# Patient Record
Sex: MALE | Race: WHITE | Employment: OTHER | URBAN - METROPOLITAN AREA
[De-identification: names, ages, dates, MRNs, and addresses within clinical notes are randomized per-mention and may not be internally consistent; named-entity substitution may affect disease eponyms.]

---

## 2017-02-09 DIAGNOSIS — J44.9 COPD, SEVERE (HCC): Primary | ICD-10-CM

## 2017-02-09 RX ORDER — BUDESONIDE AND FORMOTEROL FUMARATE DIHYDRATE 160; 4.5 UG/1; UG/1
AEROSOL RESPIRATORY (INHALATION)
Qty: 30.6 INHALER | Refills: 1 | Status: SHIPPED | OUTPATIENT
Start: 2017-02-09 | End: 2017-08-08

## 2017-08-08 DIAGNOSIS — J44.9 COPD, SEVERE (HCC): ICD-10-CM

## 2017-08-13 RX ORDER — BUDESONIDE AND FORMOTEROL FUMARATE DIHYDRATE 160; 4.5 UG/1; UG/1
AEROSOL RESPIRATORY (INHALATION)
Qty: 30.6 INHALER | Refills: 0 | Status: SHIPPED | OUTPATIENT
Start: 2017-08-13 | End: 2017-11-12

## 2017-08-13 NOTE — TELEPHONE ENCOUNTER
This patient needs Medicare AWV. Make OV. I did send a prescription refill to his pharmacy for the Symbicort. Riaz Caceres. Martha Fountain MD  Diplomate, American Board of Internal Medicine  [facility]  [address] [place] [address],[address] Floor, [address], [facility], [address] [place] [address]

## 2017-11-12 DIAGNOSIS — J44.9 COPD, SEVERE (HCC): ICD-10-CM

## 2017-11-13 RX ORDER — BUDESONIDE AND FORMOTEROL FUMARATE DIHYDRATE 160; 4.5 UG/1; UG/1
AEROSOL RESPIRATORY (INHALATION)
Qty: 3 INHALER | Refills: 1 | Status: SHIPPED | OUTPATIENT
Start: 2017-11-13 | End: 2018-01-30

## 2017-11-13 NOTE — TELEPHONE ENCOUNTER
Requesting Symbicort   LOV: 12-13-16  RTC: 6 months   Last Relevant Labs: 5-11-6   Filled: 6-22-16 #1 inhaler with 11 refills    No future appointments.      NO ASTHMA its COPD

## 2018-01-30 ENCOUNTER — TELEPHONE (OUTPATIENT)
Dept: INTERNAL MEDICINE CLINIC | Facility: CLINIC | Age: 73
End: 2018-01-30

## 2018-01-30 DIAGNOSIS — J44.9 COPD, SEVERE (HCC): ICD-10-CM

## 2018-01-30 RX ORDER — BUDESONIDE AND FORMOTEROL FUMARATE DIHYDRATE 160; 4.5 UG/1; UG/1
AEROSOL RESPIRATORY (INHALATION)
Qty: 3 INHALER | Refills: 1 | Status: SHIPPED | OUTPATIENT
Start: 2018-01-30 | End: 2018-09-23

## 2018-01-30 NOTE — TELEPHONE ENCOUNTER
Patient's spouse calling to request refill for [facility] 160-4.5 MCG/ACT Inhalation Aerosol.

## 2018-07-13 ENCOUNTER — OFFICE VISIT (OUTPATIENT)
Dept: INTERNAL MEDICINE CLINIC | Facility: CLINIC | Age: 73
End: 2018-07-13

## 2018-07-13 VITALS
BODY MASS INDEX: 28 KG/M2 | SYSTOLIC BLOOD PRESSURE: 130 MMHG | WEIGHT: 200.5 LBS | DIASTOLIC BLOOD PRESSURE: 70 MMHG | TEMPERATURE: 98 F | OXYGEN SATURATION: 94 % | HEART RATE: 84 BPM | RESPIRATION RATE: 24 BRPM

## 2018-07-13 DIAGNOSIS — R91.8 MULTIPLE PULMONARY NODULES DETERMINED BY COMPUTED TOMOGRAPHY OF LUNG: ICD-10-CM

## 2018-07-13 DIAGNOSIS — J44.9 COPD, SEVERE (HCC): Primary | ICD-10-CM

## 2018-07-13 DIAGNOSIS — F17.200 TOBACCO USE DISORDER: ICD-10-CM

## 2018-07-13 PROCEDURE — 99214 OFFICE O/P EST MOD 30 MIN: CPT | Performed by: INTERNAL MEDICINE

## 2018-07-13 RX ORDER — AZITHROMYCIN 250 MG/1
TABLET, FILM COATED ORAL
Qty: 6 TABLET | Refills: 0 | Status: SHIPPED | OUTPATIENT
Start: 2018-07-13 | End: 2018-07-31

## 2018-07-13 RX ORDER — METHYLPREDNISOLONE 4 MG/1
TABLET ORAL
Qty: 1 KIT | Refills: 0 | Status: SHIPPED | OUTPATIENT
Start: 2018-07-13 | End: 2018-07-31
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
## 2018-07-13 NOTE — PATIENT INSTRUCTIONS
- Start steroid pack today. Follow instructions on box  - Start antibiotics today (2 tablets today, then 1 tablet daily until you run out)  - Let us know if you are not feeling better after finishing the steroids and antibiotics.     It was a pleasure seei

## 2018-07-31 NOTE — TELEPHONE ENCOUNTER
Pt requesting 2nd round of antibiotic and steroid. Symptoms have improved but still c/o cough, wheezing and excessive phlegm. No c/o fever or chills. Please send to [facility].

## 2018-07-31 NOTE — TELEPHONE ENCOUNTER
From: Ayan Quinones  Sent: 7/31/2018 4:30 PM CDT  Subject: Medication Renewal Request    Ayan Quinones would like a refill of the following medications:     methylPREDNISolone (MEDROL) 4 MG Oral Tablet Therapy Pack Riddhi Camargo MD]     a

## 2018-08-01 RX ORDER — METHYLPREDNISOLONE 4 MG/1
TABLET ORAL
Qty: 1 KIT | Refills: 0 | Status: SHIPPED
Start: 2018-08-01 | End: 2019-07-24 | Stop reason: ALTCHOICE

## 2018-08-01 RX ORDER — AZITHROMYCIN 250 MG/1
TABLET, FILM COATED ORAL
Qty: 6 TABLET | Refills: 0 | Status: SHIPPED
Start: 2018-08-01 | End: 2019-07-24

## 2018-09-23 DIAGNOSIS — J44.9 COPD, SEVERE (HCC): ICD-10-CM

## 2018-09-24 RX ORDER — BUDESONIDE AND FORMOTEROL FUMARATE DIHYDRATE 160; 4.5 UG/1; UG/1
2 AEROSOL RESPIRATORY (INHALATION) 2 TIMES DAILY
Qty: 3 INHALER | Refills: 1 | Status: SHIPPED | OUTPATIENT
Start: 2018-09-24 | End: 2019-03-29

## 2018-09-24 NOTE — TELEPHONE ENCOUNTER
Refill requested:   Requested Prescriptions     Pending Prescriptions Disp Refills   • Budesonide-Formoterol Fumarate (SYMBICORT) 160-4.5 MCG/ACT Inhalation Aerosol [Pharmacy Med Name: SYMBICORT -4.5] 3 Inhaler 1     Sig: Inhale 2 puffs into the [place] [facility]

## 2019-03-29 DIAGNOSIS — J44.9 COPD, SEVERE (HCC): ICD-10-CM

## 2019-03-29 RX ORDER — BUDESONIDE AND FORMOTEROL FUMARATE DIHYDRATE 160; 4.5 UG/1; UG/1
2 AEROSOL RESPIRATORY (INHALATION) 2 TIMES DAILY
Qty: 3 INHALER | Refills: 0 | Status: SHIPPED | OUTPATIENT
Start: 2019-03-29 | End: 2019-07-09

## 2019-03-29 NOTE — TELEPHONE ENCOUNTER
Symbicort 160-4.5 mg 2 puffs bid filed 9-24-18 3 inhalers with 1 refill     LOV 7-13-18     He does have chronic, severe COPD. States he has been compliant with daily Symbicort.   COPD, severe  Acute exacerbation    return to clinic asap with Dr. Sanjay Kaur

## 2019-07-09 DIAGNOSIS — J44.9 COPD, SEVERE (HCC): ICD-10-CM

## 2019-07-09 NOTE — TELEPHONE ENCOUNTER
Last OV: 7/13/18 with Dr. Afshan Villarreal  Last refill date: 3/29/19     #/refills: #3 Inhaler, 0 refills  When pt was asked to return for OV: asap  Upcoming appt/reason: no upcoming appt  Last labs 6/28/16    Hx of COPD

## 2019-07-10 RX ORDER — BUDESONIDE AND FORMOTEROL FUMARATE DIHYDRATE 160; 4.5 UG/1; UG/1
2 AEROSOL RESPIRATORY (INHALATION) 2 TIMES DAILY
Qty: 3 INHALER | Refills: 0 | Status: SHIPPED | OUTPATIENT
Start: 2019-07-10 | End: 2019-10-15

## 2019-07-24 ENCOUNTER — LAB ENCOUNTER (OUTPATIENT)
Dept: LAB | Age: 74
End: 2019-07-24
Attending: INTERNAL MEDICINE
Payer: MEDICARE

## 2019-07-24 ENCOUNTER — OFFICE VISIT (OUTPATIENT)
Dept: INTERNAL MEDICINE CLINIC | Facility: CLINIC | Age: 74
End: 2019-07-24
Payer: MEDICARE

## 2019-07-24 VITALS
OXYGEN SATURATION: 98 % | HEART RATE: 84 BPM | SYSTOLIC BLOOD PRESSURE: 138 MMHG | WEIGHT: 196.25 LBS | TEMPERATURE: 98 F | DIASTOLIC BLOOD PRESSURE: 68 MMHG | BODY MASS INDEX: 27.48 KG/M2 | HEIGHT: 71 IN

## 2019-07-24 DIAGNOSIS — Z12.5 SCREENING FOR MALIGNANT NEOPLASM OF PROSTATE: ICD-10-CM

## 2019-07-24 DIAGNOSIS — D12.2 ADENOMATOUS POLYP OF ASCENDING COLON: ICD-10-CM

## 2019-07-24 DIAGNOSIS — Z87.891 PERSONAL HISTORY OF NICOTINE DEPENDENCE: ICD-10-CM

## 2019-07-24 DIAGNOSIS — R53.82 CHRONIC FATIGUE: ICD-10-CM

## 2019-07-24 DIAGNOSIS — R91.8 MULTIPLE PULMONARY NODULES DETERMINED BY COMPUTED TOMOGRAPHY OF LUNG: ICD-10-CM

## 2019-07-24 DIAGNOSIS — R73.01 IMPAIRED FASTING GLUCOSE: ICD-10-CM

## 2019-07-24 DIAGNOSIS — E55.9 VITAMIN D INSUFFICIENCY: ICD-10-CM

## 2019-07-24 DIAGNOSIS — J44.9 COPD, SEVERE (HCC): ICD-10-CM

## 2019-07-24 DIAGNOSIS — R09.81 NASAL CONGESTION: ICD-10-CM

## 2019-07-24 DIAGNOSIS — Z00.00 MEDICARE ANNUAL WELLNESS VISIT, SUBSEQUENT: ICD-10-CM

## 2019-07-24 DIAGNOSIS — Z00.00 MEDICARE ANNUAL WELLNESS VISIT, SUBSEQUENT: Primary | ICD-10-CM

## 2019-07-24 LAB
ALBUMIN SERPL-MCNC: 4 G/DL (ref 3.4–5)
ALBUMIN/GLOB SERPL: 1.1 {RATIO} (ref 1–2)
ALP LIVER SERPL-CCNC: 100 U/L (ref 45–117)
ALT SERPL-CCNC: 32 U/L (ref 16–61)
ANION GAP SERPL CALC-SCNC: 6 MMOL/L (ref 0–18)
AST SERPL-CCNC: 19 U/L (ref 15–37)
BASOPHILS # BLD AUTO: 0.06 X10(3) UL (ref 0–0.2)
BASOPHILS NFR BLD AUTO: 0.7 %
BILIRUB SERPL-MCNC: 0.8 MG/DL (ref 0.1–2)
BUN BLD-MCNC: 15 MG/DL (ref 7–18)
BUN/CREAT SERPL: 13.3 (ref 10–20)
CALCIUM BLD-MCNC: 9.5 MG/DL (ref 8.5–10.1)
CHLORIDE SERPL-SCNC: 106 MMOL/L (ref 98–112)
CHOLEST SMN-MCNC: 162 MG/DL (ref ?–200)
CO2 SERPL-SCNC: 25 MMOL/L (ref 21–32)
COMPLEXED PSA SERPL-MCNC: 0.29 NG/ML (ref ?–4)
CREAT BLD-MCNC: 1.13 MG/DL (ref 0.7–1.3)
DEPRECATED RDW RBC AUTO: 46.2 FL (ref 35.1–46.3)
EOSINOPHIL # BLD AUTO: 0.31 X10(3) UL (ref 0–0.7)
EOSINOPHIL NFR BLD AUTO: 3.6 %
ERYTHROCYTE [DISTWIDTH] IN BLOOD BY AUTOMATED COUNT: 12.9 % (ref 11–15)
EST. AVERAGE GLUCOSE BLD GHB EST-MCNC: 120 MG/DL (ref 68–126)
GLOBULIN PLAS-MCNC: 3.5 G/DL (ref 2.8–4.4)
GLUCOSE BLD-MCNC: 103 MG/DL (ref 70–99)
HBA1C MFR BLD HPLC: 5.8 % (ref ?–5.7)
HCT VFR BLD AUTO: 48.6 % (ref 39–53)
HDLC SERPL-MCNC: 35 MG/DL (ref 40–59)
HGB BLD-MCNC: 15.8 G/DL (ref 13–17.5)
IMM GRANULOCYTES # BLD AUTO: 0.02 X10(3) UL (ref 0–1)
IMM GRANULOCYTES NFR BLD: 0.2 %
LDLC SERPL CALC-MCNC: 103 MG/DL (ref ?–100)
LYMPHOCYTES # BLD AUTO: 2.6 X10(3) UL (ref 1–4)
LYMPHOCYTES NFR BLD AUTO: 30.3 %
M PROTEIN MFR SERPL ELPH: 7.5 G/DL (ref 6.4–8.2)
MCH RBC QN AUTO: 31.3 PG (ref 26–34)
MCHC RBC AUTO-ENTMCNC: 32.5 G/DL (ref 31–37)
MCV RBC AUTO: 96.2 FL (ref 80–100)
MONOCYTES # BLD AUTO: 0.66 X10(3) UL (ref 0.1–1)
MONOCYTES NFR BLD AUTO: 7.7 %
NEUTROPHILS # BLD AUTO: 4.94 X10 (3) UL (ref 1.5–7.7)
NEUTROPHILS # BLD AUTO: 4.94 X10(3) UL (ref 1.5–7.7)
NEUTROPHILS NFR BLD AUTO: 57.5 %
NONHDLC SERPL-MCNC: 127 MG/DL (ref ?–130)
OSMOLALITY SERPL CALC.SUM OF ELEC: 285 MOSM/KG (ref 275–295)
PLATELET # BLD AUTO: 207 10(3)UL (ref 150–450)
POTASSIUM SERPL-SCNC: 4 MMOL/L (ref 3.5–5.1)
RBC # BLD AUTO: 5.05 X10(6)UL (ref 3.8–5.8)
SODIUM SERPL-SCNC: 137 MMOL/L (ref 136–145)
TRIGL SERPL-MCNC: 121 MG/DL (ref 30–149)
TSI SER-ACNC: 2.07 MIU/ML (ref 0.36–3.74)
VIT D+METAB SERPL-MCNC: 35.8 NG/ML (ref 30–100)
VLDLC SERPL CALC-MCNC: 24 MG/DL (ref 0–30)
WBC # BLD AUTO: 8.6 X10(3) UL (ref 4–11)

## 2019-07-24 PROCEDURE — G0439 PPPS, SUBSEQ VISIT: HCPCS | Performed by: INTERNAL MEDICINE

## 2019-07-24 PROCEDURE — 84443 ASSAY THYROID STIM HORMONE: CPT

## 2019-07-24 PROCEDURE — 84402 ASSAY OF FREE TESTOSTERONE: CPT

## 2019-07-24 PROCEDURE — 99214 OFFICE O/P EST MOD 30 MIN: CPT | Performed by: INTERNAL MEDICINE

## 2019-07-24 PROCEDURE — 36415 COLL VENOUS BLD VENIPUNCTURE: CPT

## 2019-07-24 PROCEDURE — 80061 LIPID PANEL: CPT

## 2019-07-24 PROCEDURE — 80053 COMPREHEN METABOLIC PANEL: CPT

## 2019-07-24 PROCEDURE — 84403 ASSAY OF TOTAL TESTOSTERONE: CPT

## 2019-07-24 PROCEDURE — 85025 COMPLETE CBC W/AUTO DIFF WBC: CPT

## 2019-07-24 PROCEDURE — 83036 HEMOGLOBIN GLYCOSYLATED A1C: CPT

## 2019-07-24 PROCEDURE — 82306 VITAMIN D 25 HYDROXY: CPT

## 2019-07-24 RX ORDER — ALBUTEROL SULFATE 90 UG/1
2 AEROSOL, METERED RESPIRATORY (INHALATION) EVERY 4 HOURS PRN
Qty: 3 INHALER | Refills: 0 | Status: ON HOLD | OUTPATIENT
Start: 2019-07-24 | End: 2020-07-28

## 2019-07-24 NOTE — PROGRESS NOTES
HPI:   Adrianna Taylor is a 69year old male who presents for a Medicare Subsequent Annual Wellness visit (Pt already had Initial Annual Wellness) and follow up on chronic medical issues.   His last annual assessment has been over 1 year: Annual Physic explanation and discussion of advance directives standard forms performed Face to Face with patient and Family/surrogate (if present), and forms available to patient in AVS         He currently smokes tobacco.  Social History    Tobacco Use      Smoking st Inhalation Aero Soln Inhale 2 puffs into the lungs every 4 (four) hours as needed for Wheezing or Shortness of Breath. Budesonide-Formoterol Fumarate (SYMBICORT) 160-4.5 MCG/ACT Inhalation Aerosol Inhale 2 puffs into the lungs 2 (two) times daily.    Chol history  ALL/ASTHMA: denies hx of allergy or asthma  EXAM:   /68 (BP Location: Right arm, Patient Position: Sitting, Cuff Size: large)   Pulse 84   Temp 97.7 °F (36.5 °C) (Oral)   Ht 71\"   Wt 196 lb 4 oz   SpO2 98%   BMI 27.37 kg/m²   Estimated body focus on diet/exercise. He had colonoscopy in 2013 - hyperplastic and adenomatous polyp. Due for repeat colonoscopy. Last colonoscopy done by Dr. Shavonne Davis - he has retired. Will refer to his partners. - CBC WITH DIFFERENTIAL WITH PLATELET;  Future  - COM PLAN:  The patient indicates understanding of these issues and agrees to the plan. Reinforced healthy diet, lifestyle, and exercise. Return in about 1 year (around 7/24/2020).      Robert Vick MD, 7/24/2019     General Health     In the past six Influenza  Covered Annually 12/5/2017   Please get every year    Pneumococcal 13 (Prevnar)  Covered Once after 65 12/05/2017 Please get once after your 65th birthday    Pneumococcal 23 (Pneumovax)  Covered Once after 65 12/26/2012 Please get once after

## 2019-07-24 NOTE — PATIENT INSTRUCTIONS
- Get blood work done today  - Schedule lung screening CT scan ([phone])  - We will have you see one of Dr. Cole Lowe partners for your next colonoscopy when you are due. - Albuterol refilled. It was a pleasure seeing you in the clinic today.   Than

## 2019-07-25 PROBLEM — R73.03 PREDIABETES: Status: ACTIVE | Noted: 2019-07-25

## 2019-07-28 DIAGNOSIS — Z12.11 SCREENING FOR COLON CANCER: Primary | ICD-10-CM

## 2019-07-28 DIAGNOSIS — Z86.010 HISTORY OF ADENOMATOUS POLYP OF COLON: ICD-10-CM

## 2019-07-28 LAB
TESTOSTERONE TOTAL: 442.6 NG/DL
TESTOSTERONE, FREE -MS/MS: 70.6 PG/ML

## 2019-08-09 ENCOUNTER — HOSPITAL ENCOUNTER (OUTPATIENT)
Dept: CT IMAGING | Facility: HOSPITAL | Age: 74
Discharge: HOME | End: 2019-08-09
Attending: INTERNAL MEDICINE
Payer: MEDICARE

## 2019-08-09 DIAGNOSIS — Z87.891 PERSONAL HISTORY OF NICOTINE DEPENDENCE: ICD-10-CM

## 2019-08-09 DIAGNOSIS — J44.9 COPD, SEVERE (HCC): ICD-10-CM

## 2019-08-09 DIAGNOSIS — R91.8 MULTIPLE PULMONARY NODULES DETERMINED BY COMPUTED TOMOGRAPHY OF LUNG: ICD-10-CM

## 2019-08-14 DIAGNOSIS — R91.8 ABNORMAL CT LUNG SCREENING: Primary | ICD-10-CM

## 2019-08-14 DIAGNOSIS — Z87.891 PERSONAL HISTORY OF NICOTINE DEPENDENCE: ICD-10-CM

## 2019-08-14 DIAGNOSIS — R91.1 PULMONARY NODULE, RIGHT: ICD-10-CM

## 2019-08-23 ENCOUNTER — OFFICE VISIT (OUTPATIENT)
Dept: OCCUPATIONAL MEDICINE | Age: 74
End: 2019-08-23
Attending: PHYSICIAN ASSISTANT
Payer: OTHER MISCELLANEOUS

## 2019-08-23 ENCOUNTER — HOSPITAL ENCOUNTER (EMERGENCY)
Facility: HOSPITAL | Age: 74
Discharge: HOME | End: 2019-08-23
Attending: EMERGENCY MEDICINE
Payer: OTHER MISCELLANEOUS

## 2019-08-23 ENCOUNTER — HOSPITAL ENCOUNTER (OUTPATIENT)
Dept: GENERAL RADIOLOGY | Age: 74
Discharge: HOME | End: 2019-08-23
Attending: PHYSICIAN ASSISTANT
Payer: OTHER MISCELLANEOUS

## 2019-08-23 VITALS
HEART RATE: 91 BPM | RESPIRATION RATE: 23 BRPM | DIASTOLIC BLOOD PRESSURE: 77 MMHG | BODY MASS INDEX: 27 KG/M2 | WEIGHT: 196 LBS | OXYGEN SATURATION: 94 % | SYSTOLIC BLOOD PRESSURE: 173 MMHG | TEMPERATURE: 99 F

## 2019-08-23 DIAGNOSIS — S20.211A RIB CONTUSION, RIGHT, INITIAL ENCOUNTER: Primary | ICD-10-CM

## 2019-08-23 DIAGNOSIS — S20.211A RIB CONTUSION, RIGHT, INITIAL ENCOUNTER: ICD-10-CM

## 2019-08-23 DIAGNOSIS — S22.41XA CLOSED FRACTURE OF MULTIPLE RIBS OF RIGHT SIDE, INITIAL ENCOUNTER: Primary | ICD-10-CM

## 2019-08-23 LAB
ALBUMIN SERPL-MCNC: 3.9 G/DL (ref 3.4–5)
ALBUMIN/GLOB SERPL: 1 {RATIO} (ref 1–2)
ALP LIVER SERPL-CCNC: 104 U/L (ref 45–117)
ALT SERPL-CCNC: 46 U/L (ref 16–61)
ANION GAP SERPL CALC-SCNC: 8 MMOL/L (ref 0–18)
APTT PPP: 38.1 SECONDS (ref 25.4–36.1)
AST SERPL-CCNC: 34 U/L (ref 15–37)
BASOPHILS # BLD AUTO: 0.03 X10(3) UL (ref 0–0.2)
BASOPHILS NFR BLD AUTO: 0.2 %
BILIRUB SERPL-MCNC: 1.2 MG/DL (ref 0.1–2)
BUN BLD-MCNC: 15 MG/DL (ref 7–18)
BUN/CREAT SERPL: 14.9 (ref 10–20)
CALCIUM BLD-MCNC: 8.9 MG/DL (ref 8.5–10.1)
CHLORIDE SERPL-SCNC: 102 MMOL/L (ref 98–112)
CO2 SERPL-SCNC: 25 MMOL/L (ref 21–32)
CREAT BLD-MCNC: 1.01 MG/DL (ref 0.7–1.3)
DEPRECATED RDW RBC AUTO: 42.4 FL (ref 35.1–46.3)
EOSINOPHIL # BLD AUTO: 0.09 X10(3) UL (ref 0–0.7)
EOSINOPHIL NFR BLD AUTO: 0.7 %
ERYTHROCYTE [DISTWIDTH] IN BLOOD BY AUTOMATED COUNT: 12.5 % (ref 11–15)
GLOBULIN PLAS-MCNC: 4.1 G/DL (ref 2.8–4.4)
GLUCOSE BLD-MCNC: 130 MG/DL (ref 70–99)
HCT VFR BLD AUTO: 44.3 % (ref 39–53)
HGB BLD-MCNC: 15 G/DL (ref 13–17.5)
IMM GRANULOCYTES # BLD AUTO: 0.08 X10(3) UL (ref 0–1)
IMM GRANULOCYTES NFR BLD: 0.6 %
INR BLD: 1.03 (ref 0.9–1.1)
LYMPHOCYTES # BLD AUTO: 2.2 X10(3) UL (ref 1–4)
LYMPHOCYTES NFR BLD AUTO: 16.4 %
M PROTEIN MFR SERPL ELPH: 8 G/DL (ref 6.4–8.2)
MCH RBC QN AUTO: 31.3 PG (ref 26–34)
MCHC RBC AUTO-ENTMCNC: 33.9 G/DL (ref 31–37)
MCV RBC AUTO: 92.3 FL (ref 80–100)
MONOCYTES # BLD AUTO: 1.23 X10(3) UL (ref 0.1–1)
MONOCYTES NFR BLD AUTO: 9.2 %
NEUTROPHILS # BLD AUTO: 9.75 X10 (3) UL (ref 1.5–7.7)
NEUTROPHILS # BLD AUTO: 9.75 X10(3) UL (ref 1.5–7.7)
NEUTROPHILS NFR BLD AUTO: 72.9 %
OSMOLALITY SERPL CALC.SUM OF ELEC: 283 MOSM/KG (ref 275–295)
PLATELET # BLD AUTO: 241 10(3)UL (ref 150–450)
POTASSIUM SERPL-SCNC: 4.2 MMOL/L (ref 3.5–5.1)
PSA SERPL DL<=0.01 NG/ML-MCNC: 13.9 SECONDS (ref 12.5–14.7)
RBC # BLD AUTO: 4.8 X10(6)UL (ref 3.8–5.8)
SODIUM SERPL-SCNC: 135 MMOL/L (ref 136–145)
TROPONIN I SERPL-MCNC: <0.045 NG/ML (ref ?–0.04)
WBC # BLD AUTO: 13.4 X10(3) UL (ref 4–11)

## 2019-08-23 PROCEDURE — 93005 ELECTROCARDIOGRAM TRACING: CPT

## 2019-08-23 PROCEDURE — 99284 EMERGENCY DEPT VISIT MOD MDM: CPT

## 2019-08-23 PROCEDURE — 36415 COLL VENOUS BLD VENIPUNCTURE: CPT

## 2019-08-23 PROCEDURE — 93010 ELECTROCARDIOGRAM REPORT: CPT

## 2019-08-23 PROCEDURE — 80053 COMPREHEN METABOLIC PANEL: CPT | Performed by: EMERGENCY MEDICINE

## 2019-08-23 PROCEDURE — 84484 ASSAY OF TROPONIN QUANT: CPT | Performed by: EMERGENCY MEDICINE

## 2019-08-23 PROCEDURE — 85730 THROMBOPLASTIN TIME PARTIAL: CPT | Performed by: EMERGENCY MEDICINE

## 2019-08-23 PROCEDURE — 85025 COMPLETE CBC W/AUTO DIFF WBC: CPT | Performed by: EMERGENCY MEDICINE

## 2019-08-23 PROCEDURE — 85610 PROTHROMBIN TIME: CPT | Performed by: EMERGENCY MEDICINE

## 2019-08-23 PROCEDURE — 71101 X-RAY EXAM UNILAT RIBS/CHEST: CPT | Performed by: PHYSICIAN ASSISTANT

## 2019-08-23 RX ORDER — TRAMADOL HYDROCHLORIDE 50 MG/1
50 TABLET ORAL EVERY 6 HOURS PRN
Qty: 20 TABLET | Refills: 0 | Status: SHIPPED | OUTPATIENT
Start: 2019-08-23 | End: 2019-08-30

## 2019-08-23 RX ORDER — IPRATROPIUM BROMIDE AND ALBUTEROL SULFATE 2.5; .5 MG/3ML; MG/3ML
3 SOLUTION RESPIRATORY (INHALATION)
Status: DISCONTINUED | OUTPATIENT
Start: 2019-08-23 | End: 2019-08-23
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
## 2019-08-25 LAB
ATRIAL RATE: 102 BPM
P AXIS: 42 DEGREES
P-R INTERVAL: 180 MS
Q-T INTERVAL: 342 MS
QRS DURATION: 88 MS
QTC CALCULATION (BEZET): 445 MS
R AXIS: 33 DEGREES
T AXIS: 49 DEGREES
VENTRICULAR RATE: 102 BPM

## 2019-08-28 ENCOUNTER — TELEPHONE (OUTPATIENT)
Dept: INTERNAL MEDICINE CLINIC | Facility: CLINIC | Age: 74
End: 2019-08-28

## 2019-08-28 ENCOUNTER — OFFICE VISIT (OUTPATIENT)
Dept: OCCUPATIONAL MEDICINE | Age: 74
End: 2019-08-28
Attending: PHYSICIAN ASSISTANT
Payer: OTHER MISCELLANEOUS

## 2019-08-28 DIAGNOSIS — S22.31XD FRACTURE OF ONE RIB, RIGHT SIDE, SUBSEQUENT ENCOUNTER FOR FRACTURE WITH ROUTINE HEALING: Primary | ICD-10-CM

## 2019-08-30 ENCOUNTER — OFFICE VISIT (OUTPATIENT)
Dept: INTERNAL MEDICINE CLINIC | Facility: CLINIC | Age: 74
End: 2019-08-30
Payer: MEDICARE

## 2019-08-30 VITALS
RESPIRATION RATE: 20 BRPM | DIASTOLIC BLOOD PRESSURE: 70 MMHG | TEMPERATURE: 98 F | WEIGHT: 199.5 LBS | HEIGHT: 71 IN | SYSTOLIC BLOOD PRESSURE: 146 MMHG | HEART RATE: 72 BPM | BODY MASS INDEX: 27.93 KG/M2

## 2019-08-30 DIAGNOSIS — R73.03 PREDIABETES: ICD-10-CM

## 2019-08-30 DIAGNOSIS — S22.41XA CLOSED FRACTURE OF MULTIPLE RIBS OF RIGHT SIDE, INITIAL ENCOUNTER: Primary | ICD-10-CM

## 2019-08-30 DIAGNOSIS — J44.9 COPD, SEVERE (HCC): ICD-10-CM

## 2019-08-30 DIAGNOSIS — Z12.11 SCREENING FOR COLON CANCER: ICD-10-CM

## 2019-08-30 DIAGNOSIS — R91.8 MULTIPLE PULMONARY NODULES DETERMINED BY COMPUTED TOMOGRAPHY OF LUNG: ICD-10-CM

## 2019-08-30 PROCEDURE — 99214 OFFICE O/P EST MOD 30 MIN: CPT | Performed by: INTERNAL MEDICINE
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
## 2019-08-30 NOTE — PATIENT INSTRUCTIONS
- Rib fractures should heal on their own. - Continue with incentive spirometer  - Continue with ibuprofen as needed for pain, tramadol for severe pain. - Schedule repeat low-dose CT scan in February for monitoring of your lung nodule.   - Follow up with ERLIN

## 2019-09-11 ENCOUNTER — OFFICE VISIT (OUTPATIENT)
Dept: OCCUPATIONAL MEDICINE | Age: 74
End: 2019-09-11
Attending: PHYSICIAN ASSISTANT
Payer: OTHER MISCELLANEOUS

## 2019-09-11 ENCOUNTER — HOSPITAL ENCOUNTER (OUTPATIENT)
Dept: GENERAL RADIOLOGY | Age: 74
Discharge: HOME | End: 2019-09-11
Attending: PHYSICIAN ASSISTANT
Payer: OTHER MISCELLANEOUS

## 2019-09-11 DIAGNOSIS — S22.31XD FRACTURE OF ONE RIB, RIGHT SIDE, SUBSEQUENT ENCOUNTER FOR FRACTURE WITH ROUTINE HEALING: Primary | ICD-10-CM

## 2019-09-11 DIAGNOSIS — S22.31XD FRACTURE OF ONE RIB, RIGHT SIDE, SUBSEQUENT ENCOUNTER FOR FRACTURE WITH ROUTINE HEALING: ICD-10-CM

## 2019-09-11 PROCEDURE — 71101 X-RAY EXAM UNILAT RIBS/CHEST: CPT | Performed by: PHYSICIAN ASSISTANT

## 2019-10-15 DIAGNOSIS — J44.9 COPD, SEVERE (HCC): ICD-10-CM

## 2019-10-16 RX ORDER — BUDESONIDE AND FORMOTEROL FUMARATE DIHYDRATE 160; 4.5 UG/1; UG/1
2 AEROSOL RESPIRATORY (INHALATION) 2 TIMES DAILY
Qty: 3 INHALER | Refills: 0 | Status: SHIPPED | OUTPATIENT
Start: 2019-10-16 | End: 2020-01-14

## 2020-01-14 DIAGNOSIS — J44.9 COPD, SEVERE (HCC): ICD-10-CM

## 2020-01-15 RX ORDER — BUDESONIDE AND FORMOTEROL FUMARATE DIHYDRATE 160; 4.5 UG/1; UG/1
2 AEROSOL RESPIRATORY (INHALATION) 2 TIMES DAILY
Qty: 3 INHALER | Refills: 1 | Status: SHIPPED | OUTPATIENT
Start: 2020-01-15 | End: 2020-08-16

## 2020-01-15 NOTE — TELEPHONE ENCOUNTER
Last OV: 8/30/19 with Dr. Yonas Chauhan   Last refill date: 10/16/19     #/refills: #3 Inhaler, 0 refills  When pt was asked to return for OV: 3-6 months  Upcoming appt/reason: no upcoming appt  Last labs 8/23/19

## 2020-02-26 DIAGNOSIS — R91.8 MULTIPLE PULMONARY NODULES DETERMINED BY COMPUTED TOMOGRAPHY OF LUNG: ICD-10-CM

## 2020-02-26 DIAGNOSIS — Z87.891 PERSONAL HISTORY OF TOBACCO USE: Primary | ICD-10-CM

## 2020-02-26 NOTE — PROGRESS NOTES
Patient is now due for updated Lung Screening CT scan - order placed. Please reach out to patient and have him schedule it.   He can call central scheduling, the radiology department note also has their direct number [phone] (I'm not sure if this is

## 2020-02-27 NOTE — PROGRESS NOTES
Left detailed message on pt's vm, ok per HIPPA. Informing due for a repeat CT of lungs and to call us back if has additional questions. Also [employer] message sent to pt informing about CT.

## 2020-03-02 NOTE — PROGRESS NOTES
Pt contacted. States he received the message regarding the need for lung CT and will call to schedule. Contact information for scheduling provided.

## 2020-03-06 ENCOUNTER — HOSPITAL ENCOUNTER (OUTPATIENT)
Dept: CT IMAGING | Facility: HOSPITAL | Age: 75
Discharge: HOME | End: 2020-03-06
Attending: INTERNAL MEDICINE
Payer: MEDICARE

## 2020-03-06 DIAGNOSIS — Z87.891 PERSONAL HISTORY OF TOBACCO USE: ICD-10-CM

## 2020-03-06 DIAGNOSIS — R91.8 MULTIPLE PULMONARY NODULES DETERMINED BY COMPUTED TOMOGRAPHY OF LUNG: ICD-10-CM

## 2020-03-13 DIAGNOSIS — R91.8 MULTIPLE PULMONARY NODULES DETERMINED BY COMPUTED TOMOGRAPHY OF LUNG: Primary | ICD-10-CM

## 2020-05-19 ENCOUNTER — VIRTUAL PHONE E/M (OUTPATIENT)
Dept: INTERNAL MEDICINE CLINIC | Facility: CLINIC | Age: 75
End: 2020-05-19
Payer: MEDICARE

## 2020-05-19 DIAGNOSIS — R91.8 MULTIPLE PULMONARY NODULES DETERMINED BY COMPUTED TOMOGRAPHY OF LUNG: Primary | ICD-10-CM

## 2020-05-19 DIAGNOSIS — J44.9 COPD, SEVERE (HCC): ICD-10-CM

## 2020-05-19 PROCEDURE — 99442 PHONE E/M BY PHYS 11-20 MIN: CPT | Performed by: INTERNAL MEDICINE

## 2020-05-19 NOTE — PROGRESS NOTES
Georginakiley Lacey verbally consents to a Virtual/Telephone Check-In service on 05/19/20. Patient understands and accepts financial responsibility for any deductible, co-insurance and/or co-pays associated with this service.      Duration of the service: have been monitored with low-dose screening lung CT scans. Most recently, he had a screening CT done in March 2020. A nodule in the right upper lobe has grown in size. It is irregular in appearance. Lung-RADS category 4A.   Patient's CT scan was reviewe

## 2020-05-19 NOTE — PATIENT INSTRUCTIONS
- Ok to schedule lung CT scan  - I will message you codes for CT scan. You can talk to Medicare about cost before scheduling the test  - We will try an additional inhaler for your COPD - Spiriva. Use once daily.   - We will likely have you follow up with

## 2020-05-20 ENCOUNTER — TELEPHONE (OUTPATIENT)
Dept: INTERNAL MEDICINE CLINIC | Facility: CLINIC | Age: 75
End: 2020-05-20

## 2020-05-20 DIAGNOSIS — J44.9 COPD, SEVERE (HCC): Primary | Chronic | ICD-10-CM

## 2020-05-20 NOTE — TELEPHONE ENCOUNTER
Spiriva is not covered by insurance please send alternative medication or we can initiate the PA, please advise.        PA phone# [phone]  Patient ID [id number]

## 2020-06-18 ENCOUNTER — OFFICE VISIT (OUTPATIENT)
Dept: INTERNAL MEDICINE CLINIC | Facility: CLINIC | Age: 75
End: 2020-06-18
Payer: MEDICARE

## 2020-06-18 VITALS
RESPIRATION RATE: 18 BRPM | OXYGEN SATURATION: 98 % | WEIGHT: 192.75 LBS | TEMPERATURE: 98 F | SYSTOLIC BLOOD PRESSURE: 130 MMHG | BODY MASS INDEX: 26.98 KG/M2 | DIASTOLIC BLOOD PRESSURE: 70 MMHG | HEART RATE: 84 BPM | HEIGHT: 71 IN

## 2020-06-18 DIAGNOSIS — R91.8 MULTIPLE PULMONARY NODULES DETERMINED BY COMPUTED TOMOGRAPHY OF LUNG: ICD-10-CM

## 2020-06-18 DIAGNOSIS — R73.03 PREDIABETES: ICD-10-CM

## 2020-06-18 DIAGNOSIS — J44.9 COPD, SEVERE (HCC): Chronic | ICD-10-CM

## 2020-06-18 DIAGNOSIS — F17.200 TOBACCO USE DISORDER: ICD-10-CM

## 2020-06-18 DIAGNOSIS — Z01.818 PREOPERATIVE EXAMINATION: Primary | ICD-10-CM

## 2020-06-18 PROCEDURE — 99214 OFFICE O/P EST MOD 30 MIN: CPT | Performed by: INTERNAL MEDICINE

## 2020-06-18 NOTE — PATIENT INSTRUCTIONS
- Ok to proceed with your biopsy from my perspective. - No over the counter medications until after the procedure.  - Ok to use your inhaler on the morning of the biopsy.   - I will call you with the biopsy results, usually it takes about 1 week to get res

## 2020-06-18 NOTE — PROGRESS NOTES
Kriss Hernandez is a 77year old male who presents for a pre-operative physical exam.   Kriss Hernandez is scheduled for a CT guided lung biopsy procedure at [facility] on 6/24/20 performed by Radiology department, who has requested that I p needed for Wheezing or Shortness of Breath., Disp: 3 Inhaler, Rfl: 0  •  Cholecalciferol (VITAMIN D3) 1000 UNITS Oral Cap, Take 1 tablet by mouth daily.   , Disp: , Rfl:   Medical:  has a past medical history of Arrhythmia (1990), Chronic bronchitis ([facility] [address] hepatosplenomegaly, bowel sounds throughout  PSYCH: pleasant, appropriate mood and affect  LABORATORY DATA:   N/A  ASSESSMENT AND PLAN:   1. Pre-operative examination  Patient presents for pre-operative examination at the request of performing surgeon.   P

## 2020-06-22 ENCOUNTER — APPOINTMENT (OUTPATIENT)
Dept: LAB | Facility: HOSPITAL | Age: 75
End: 2020-06-22
Attending: INTERNAL MEDICINE
Payer: MEDICARE

## 2020-06-22 ENCOUNTER — HOSPITAL ENCOUNTER (OUTPATIENT)
Dept: CT IMAGING | Facility: HOSPITAL | Age: 75
Discharge: HOME | End: 2020-06-22
Attending: INTERNAL MEDICINE
Payer: MEDICARE

## 2020-06-22 DIAGNOSIS — Z11.59 ENCOUNTER FOR SCREENING FOR OTHER VIRAL DISEASES: ICD-10-CM

## 2020-06-22 DIAGNOSIS — Z01.818 PREOP EXAMINATION: ICD-10-CM

## 2020-06-24 ENCOUNTER — HOSPITAL ENCOUNTER (OUTPATIENT)
Dept: CT IMAGING | Facility: HOSPITAL | Age: 75
Discharge: HOME | End: 2020-06-24
Attending: INTERNAL MEDICINE
Payer: MEDICARE

## 2020-06-24 ENCOUNTER — HOSPITAL ENCOUNTER (OUTPATIENT)
Dept: GENERAL RADIOLOGY | Facility: HOSPITAL | Age: 75
Discharge: HOME | End: 2020-06-24
Attending: RADIOLOGY
Payer: MEDICARE

## 2020-06-24 ENCOUNTER — APPOINTMENT (OUTPATIENT)
Dept: LAB | Facility: HOSPITAL | Age: 75
End: 2020-06-24
Attending: INTERNAL MEDICINE
Payer: MEDICARE

## 2020-06-24 VITALS
TEMPERATURE: 98 F | RESPIRATION RATE: 18 BRPM | WEIGHT: 192 LBS | SYSTOLIC BLOOD PRESSURE: 130 MMHG | BODY MASS INDEX: 26.88 KG/M2 | HEIGHT: 71 IN | HEART RATE: 84 BPM | DIASTOLIC BLOOD PRESSURE: 72 MMHG | OXYGEN SATURATION: 91 %

## 2020-06-24 DIAGNOSIS — C34.90 LUNG CANCER (HCC): ICD-10-CM

## 2020-06-24 DIAGNOSIS — R91.8 MULTIPLE PULMONARY NODULES DETERMINED BY COMPUTED TOMOGRAPHY OF LUNG: ICD-10-CM

## 2020-06-24 DIAGNOSIS — R91.8 MULTIPLE PULMONARY NODULES DETERMINED BY COMPUTED TOMOGRAPHY OF LUNG: Primary | ICD-10-CM

## 2020-06-24 LAB
DEPRECATED RDW RBC AUTO: 44.7 FL (ref 35.1–46.3)
ERYTHROCYTE [DISTWIDTH] IN BLOOD BY AUTOMATED COUNT: 13.1 % (ref 11–15)
HCT VFR BLD AUTO: 45.7 % (ref 39–53)
HGB BLD-MCNC: 15.3 G/DL (ref 13–17.5)
INR BLD: 1.02 (ref 0.89–1.11)
MCH RBC QN AUTO: 31.2 PG (ref 26–34)
MCHC RBC AUTO-ENTMCNC: 33.5 G/DL (ref 31–37)
MCV RBC AUTO: 93.1 FL (ref 80–100)
PLATELET # BLD AUTO: 241 10(3)UL (ref 150–450)
PSA SERPL DL<=0.01 NG/ML-MCNC: 13.7 SECONDS (ref 12.4–14.6)
RBC # BLD AUTO: 4.91 X10(6)UL (ref 3.8–5.8)
WBC # BLD AUTO: 8.7 X10(3) UL (ref 4–11)

## 2020-06-24 PROCEDURE — 88360 TUMOR IMMUNOHISTOCHEM/MANUAL: CPT | Performed by: INTERNAL MEDICINE

## 2020-06-24 PROCEDURE — 88305 TISSUE EXAM BY PATHOLOGIST: CPT | Performed by: INTERNAL MEDICINE

## 2020-06-24 PROCEDURE — 85610 PROTHROMBIN TIME: CPT

## 2020-06-24 PROCEDURE — 85027 COMPLETE CBC AUTOMATED: CPT

## 2020-06-24 PROCEDURE — 36415 COLL VENOUS BLD VENIPUNCTURE: CPT

## 2020-06-24 PROCEDURE — 88342 IMHCHEM/IMCYTCHM 1ST ANTB: CPT | Performed by: INTERNAL MEDICINE

## 2020-06-24 PROCEDURE — 71045 X-RAY EXAM CHEST 1 VIEW: CPT | Performed by: RADIOLOGY

## 2020-06-24 PROCEDURE — 77012 CT SCAN FOR NEEDLE BIOPSY: CPT | Performed by: INTERNAL MEDICINE

## 2020-06-24 PROCEDURE — 88381 MICRODISSECTION MANUAL: CPT | Performed by: INTERNAL MEDICINE

## 2020-06-24 PROCEDURE — 88341 IMHCHEM/IMCYTCHM EA ADD ANTB: CPT | Performed by: INTERNAL MEDICINE

## 2020-06-24 PROCEDURE — 81235 EGFR GENE COM VARIANTS: CPT | Performed by: INTERNAL MEDICINE

## 2020-06-24 PROCEDURE — 99152 MOD SED SAME PHYS/QHP 5/>YRS: CPT | Performed by: INTERNAL MEDICINE

## 2020-06-24 PROCEDURE — 81210 BRAF GENE: CPT | Performed by: INTERNAL MEDICINE

## 2020-06-24 PROCEDURE — 32405 CT BIOPSY LUNG OR MEDIASTINUM (CPT=77012/32405): CPT | Performed by: INTERNAL MEDICINE

## 2020-06-24 RX ORDER — NALOXONE HYDROCHLORIDE 0.4 MG/ML
80 INJECTION, SOLUTION INTRAMUSCULAR; INTRAVENOUS; SUBCUTANEOUS AS NEEDED
Status: DISCONTINUED | OUTPATIENT
Start: 2020-06-24 | End: 2020-06-26

## 2020-06-24 RX ORDER — MIDAZOLAM HYDROCHLORIDE 1 MG/ML
1 INJECTION INTRAMUSCULAR; INTRAVENOUS EVERY 5 MIN PRN
Status: DISCONTINUED | OUTPATIENT
Start: 2020-06-24 | End: 2020-06-24

## 2020-06-24 RX ORDER — SODIUM CHLORIDE 9 MG/ML
INJECTION, SOLUTION INTRAVENOUS CONTINUOUS
Status: DISCONTINUED | OUTPATIENT
Start: 2020-06-24 | End: 2020-06-26

## 2020-06-24 RX ORDER — FLUMAZENIL 0.1 MG/ML
0.2 INJECTION, SOLUTION INTRAVENOUS AS NEEDED
Status: DISCONTINUED | OUTPATIENT
Start: 2020-06-24 | End: 2020-06-26

## 2020-06-24 RX ORDER — MIDAZOLAM HYDROCHLORIDE 1 MG/ML
INJECTION INTRAMUSCULAR; INTRAVENOUS
Status: COMPLETED
Start: 2020-06-24 | End: 2020-06-24

## 2020-06-24 RX ORDER — MIDAZOLAM HYDROCHLORIDE 1 MG/ML
INJECTION INTRAMUSCULAR; INTRAVENOUS
Status: DISCONTINUED
Start: 2020-06-24 | End: 2020-06-24 | Stop reason: WASHOUT

## 2020-06-24 RX ADMIN — MIDAZOLAM HYDROCHLORIDE 1 MG: 1 INJECTION INTRAMUSCULAR; INTRAVENOUS at 10:20:00

## 2020-06-24 RX ADMIN — SODIUM CHLORIDE: 9 INJECTION, SOLUTION INTRAVENOUS at 10:13:00

## 2020-06-24 RX ADMIN — MIDAZOLAM HYDROCHLORIDE 1 MG: 1 INJECTION INTRAMUSCULAR; INTRAVENOUS at 10:13:00

## 2020-06-24 NOTE — IMAGING NOTE
Pt ambulates to holding area with steady gait, VSS, speaking/laughing without complications, States he has been NPO since 6/23 1930, questions encouraged and answered.

## 2020-06-24 NOTE — PROCEDURES
[facility]  Procedure Note    Ana Wen Patient Status:  Outpatient    1945 MRN [id number]   [facility] CT Attending Tanya Marcus MD   Hosp Day # 0 PCP Christian Moody MD     Procedure: CT guided R lung biopsy    Pre-Pro

## 2020-06-24 NOTE — IMAGING NOTE
Patient tolerated procedure well, denies pain, dressing to right posterior chest CDI, VSS on RA. Report given to [facility], awaiting transport to [facility]. Pt updated on plan of care.

## 2020-06-24 NOTE — H&P
[facility]   History & Physical    Esaw Grandchild Patient Status:  Outpatient    1945 MRN [id number]   [facility] CT Attending Braden Zamudio MD   Hosp Day # 0 PCP Cyn Mercado MD     Admitting Diagnosis:   Pulm nodule    Hi Wheezing or Shortness of Breath., Disp: 3 Inhaler, Rfl: 0  •  Umeclidinium Bromide (INCRUSE ELLIPTA) 62.5 MCG/INH Inhalation Aerosol Powder, Breath Activated, Inhale 1 puff into the lungs daily. , Disp: 30 each, Rfl: 3  •  Cholecalciferol (VITAMIN D3) 1000

## 2020-06-29 DIAGNOSIS — C34.91 NON-SMALL CELL CANCER OF RIGHT LUNG (HCC): Primary | ICD-10-CM

## 2020-06-29 PROBLEM — C34.90 NON-SMALL CELL LUNG CANCER (HCC): Status: ACTIVE | Noted: 2020-06-29

## 2020-06-30 ENCOUNTER — TELEPHONE (OUTPATIENT)
Dept: INTERNAL MEDICINE CLINIC | Facility: CLINIC | Age: 75
End: 2020-06-30

## 2020-06-30 ENCOUNTER — TELEPHONE (OUTPATIENT)
Dept: HEMATOLOGY/ONCOLOGY | Facility: HOSPITAL | Age: 75
End: 2020-06-30

## 2020-06-30 NOTE — TELEPHONE ENCOUNTER
Wife called stating Dr Marce Pacheco ordered another CTscan which was not routine and because of suspicious findings. Wife states he needs a code in that order that could be used for more than 1 time a year. Medicare states there is a code like that.

## 2020-06-30 NOTE — TELEPHONE ENCOUNTER
Natalie Baig from 's office called asking if there was a sooner availability for this patient. He is currently scheduled for 7/20, and is a new dx lung cancer patient.  She asks that the patient be reached to change the appointment if there is sooner jay

## 2020-06-30 NOTE — TELEPHONE ENCOUNTER
Wife states pt received a bill for CT scan done on 3/6/20 for $1184.00. Requesting billing code be changed per recommendation of Medicare.

## 2020-07-01 ENCOUNTER — OFFICE VISIT (OUTPATIENT)
Dept: HEMATOLOGY/ONCOLOGY | Facility: HOSPITAL | Age: 75
End: 2020-07-01
Attending: INTERNAL MEDICINE
Payer: MEDICARE

## 2020-07-01 VITALS
RESPIRATION RATE: 16 BRPM | HEART RATE: 83 BPM | DIASTOLIC BLOOD PRESSURE: 89 MMHG | BODY MASS INDEX: 27.58 KG/M2 | WEIGHT: 197 LBS | OXYGEN SATURATION: 95 % | SYSTOLIC BLOOD PRESSURE: 169 MMHG | TEMPERATURE: 98 F | HEIGHT: 71 IN

## 2020-07-01 DIAGNOSIS — C34.81 MALIGNANT NEOPLASM OF OVERLAPPING SITES OF RIGHT BRONCHUS AND LUNG (HCC): ICD-10-CM

## 2020-07-01 DIAGNOSIS — R91.8 MULTIPLE PULMONARY NODULES DETERMINED BY COMPUTED TOMOGRAPHY OF LUNG: ICD-10-CM

## 2020-07-01 DIAGNOSIS — C34.91 BRONCHOGENIC CANCER OF RIGHT LUNG (HCC): Primary | ICD-10-CM

## 2020-07-01 PROCEDURE — 99205 OFFICE O/P NEW HI 60 MIN: CPT | Performed by: INTERNAL MEDICINE

## 2020-07-01 NOTE — PATIENT INSTRUCTIONS
For triage nurse: [phone] Monday through Friday 7:30-5:00.  *Please note this is a new phone number*    After hours or weekends for emergent needs:  [phone].      To schedule diagnostic testing: Central Scheduling: [place] [id number]

## 2020-07-01 NOTE — CONSULTS
Cancer Center Report of Consultation    Patient Name: Salo Cantu   YOB: 1945   Medical Record Number: [id number]   CSN: [id number]   Consulting Physician: Kady Gonsalez MD  Referring Physician(s): Dr. Mehnaz Cohen  [facility]                     Final Diagnosis:   CT guided needle core biopsy of right lung nodule:   -POSITIVE for nonsmall cell adenocarcinoma with lepidic pattern.    -(See comment)   Electronically signed by Laly Blount MD on 6/29/2020 at [phone]      Final D Clinical Information    A 77year-old male with history of smoking. Multiple pulmonary nodules.     Gross Description    Labeled with the patient's name, medical record number, right lung nodule biopsy, received in formalin: Specimen consists of a 1.0 x Packs/day: 0.50        Years: 60.00        Pack years: 30        Types: Cigarettes        Quit date: 6/15/2020        Years since quittin.0      Smokeless tobacco: Never Used      Tobacco comment: 0.5 to 1 pack daily    Substance and Sexual Activity daily., Disp: 3 Inhaler, Rfl: 1  •  Cholecalciferol (VITAMIN D3) 1000 UNITS Oral Cap, Take 1 tablet by mouth daily.   , Disp: , Rfl:   •  Umeclidinium Bromide (INCRUSE ELLIPTA) 62.5 MCG/INH Inhalation Aerosol Powder, Breath Activated, Inhale 1 puff into the 08/23/2019 1942    MCV 96.2 07/24/2019 0937    MEAN CELL VOLUME 92.6 12/19/2012 0731    MCH 31.2 06/24/2020 0823    MCH 31.3 08/23/2019 1942    MCH 31.3 07/24/2019 0937    Mean Corpuscular Hemoglobin 31.5 12/19/2012 0731    MCHC 33.5 06/24/2020 0823    MCH Albumin 3.9 08/23/2019 1942    Albumin 4.0 07/24/2019 0937    Albumin 4.2 06/28/2016 0701    ALBUMIN 4.1 08/26/2013 0836    ALBUMIN 4.6 12/19/2012 0731    Sodium 135 (L) 08/23/2019 1942    Sodium 137 07/24/2019 0937    Sodium 138 06/28/2016 0701    SODI Addended by Chely Downs MD on 3/11/2020  4:51 PM      Study Result     PROCEDURE:  CT LUNG LD SCREENING(CPT=)     COMPARISON:  [facility] , CT, CT LUNG LD SCREENING(CPT=), 5/11/2016, 6:13 PM.  [facility] , CT, CT LUNG LD SCREENING(CPT=), 8/09/20 Segment/Lobe           Right Upper Lobe                                     Location                    Slice 98 (HTF)                  Status                      Follow-up to 8/09/19                        Type                        Solid ----------------------------------------------------------  4    Comment                                                     Segment/Lobe            Left lower lobe                                    Location                    Slice 889 (HTF) PET/CT (3 months)    4b  Suspicious  Multidisciplinary Conference Review    4x  Suspicious  Category 3 or 4 nodules with other suspicious features    S  Other potentially significant findings  Follow-up based on abnormality     LungRAD scores of 3, 4A, and Date:                  Nov 09, 2019  Comments:                             ----------------------------------------------------------  ID   Finding                     Aug 09, 2019               ==========================================================  4 6.1 mm                          Short Axis                  4.4 mm                          Average Diameter            5 mm                            Lung-RADS Category          2                          ------------------------------- LDCT (6 months)    4a  Suspicious  Short-term follow-up LDCT or PET/CT (3 months)    4b  Suspicious  Multidisciplinary Conference Review    4x  Suspicious  Category 3 or 4 nodules with other suspicious features    S  Other potentially significant findings

## 2020-07-03 ENCOUNTER — LAB ENCOUNTER (OUTPATIENT)
Dept: LAB | Facility: HOSPITAL | Age: 75
End: 2020-07-03
Attending: INTERNAL MEDICINE
Payer: MEDICARE

## 2020-07-03 DIAGNOSIS — C34.91 BRONCHOGENIC CANCER OF RIGHT LUNG (HCC): ICD-10-CM

## 2020-07-03 LAB — ADEQUACY OF SPECIMEN: ADEQUATE

## 2020-07-04 LAB — SARS-COV-2 RNA RESP QL NAA+PROBE: NOT DETECTED

## 2020-07-06 ENCOUNTER — RT VISIT (OUTPATIENT)
Dept: RESPIRATORY THERAPY | Facility: HOSPITAL | Age: 75
End: 2020-07-06
Attending: INTERNAL MEDICINE
Payer: MEDICARE

## 2020-07-06 DIAGNOSIS — C34.91 BRONCHOGENIC CANCER OF RIGHT LUNG (HCC): ICD-10-CM

## 2020-07-06 LAB — BRAF CODON 600 MUTATION DETECT: NOT DETECTED

## 2020-07-06 PROCEDURE — 94729 DIFFUSING CAPACITY: CPT

## 2020-07-06 PROCEDURE — 94060 EVALUATION OF WHEEZING: CPT

## 2020-07-06 PROCEDURE — 94726 PLETHYSMOGRAPHY LUNG VOLUMES: CPT

## 2020-07-07 ENCOUNTER — TELEPHONE (OUTPATIENT)
Dept: HEMATOLOGY/ONCOLOGY | Facility: HOSPITAL | Age: 75
End: 2020-07-07

## 2020-07-07 NOTE — TELEPHONE ENCOUNTER
Patient's wife have many questions about the Dr's her  and his test and the patient need some medication for sleep,.

## 2020-07-08 ENCOUNTER — TELEPHONE (OUTPATIENT)
Dept: HEMATOLOGY/ONCOLOGY | Facility: HOSPITAL | Age: 75
End: 2020-07-08

## 2020-07-08 LAB
ALK (D5F3) BY IHC RESULT: NEGATIVE
EGFR BY PYROSEQUENCING RESULT: NOT DETECTED
MYC FISH  CELL COUNT: 0

## 2020-07-08 NOTE — PROCEDURES
Keny Solano is a 56-year-old  male who stands 6 feet 1 inches tall and weighs 195 pounds. He underwent complete pulmonary function testing on 7/6/2020. He carries a diagnosis of bronchogenic carcinoma.   He reports a 60-pack-year smoking hi

## 2020-07-10 ENCOUNTER — HOSPITAL ENCOUNTER (OUTPATIENT)
Dept: NUCLEAR MEDICINE | Facility: HOSPITAL | Age: 75
Discharge: HOME | End: 2020-07-10
Attending: INTERNAL MEDICINE
Payer: MEDICARE

## 2020-07-10 DIAGNOSIS — C34.81 MALIGNANT NEOPLASM OF OVERLAPPING SITES OF RIGHT BRONCHUS AND LUNG (HCC): ICD-10-CM

## 2020-07-10 DIAGNOSIS — C34.91 BRONCHOGENIC CANCER OF RIGHT LUNG (HCC): ICD-10-CM

## 2020-07-10 LAB — GLUCOSE BLD-MCNC: 102 MG/DL (ref 70–99)

## 2020-07-10 PROCEDURE — 78815 PET IMAGE W/CT SKULL-THIGH: CPT | Performed by: INTERNAL MEDICINE

## 2020-07-10 PROCEDURE — 82962 GLUCOSE BLOOD TEST: CPT

## 2020-07-15 ENCOUNTER — OFFICE VISIT (OUTPATIENT)
Dept: HEMATOLOGY/ONCOLOGY | Facility: HOSPITAL | Age: 75
End: 2020-07-15
Attending: INTERNAL MEDICINE
Payer: MEDICARE

## 2020-07-15 VITALS
RESPIRATION RATE: 18 BRPM | BODY MASS INDEX: 28 KG/M2 | SYSTOLIC BLOOD PRESSURE: 149 MMHG | HEIGHT: 70.87 IN | TEMPERATURE: 97 F | OXYGEN SATURATION: 96 % | DIASTOLIC BLOOD PRESSURE: 81 MMHG | HEART RATE: 87 BPM | WEIGHT: 200 LBS

## 2020-07-15 DIAGNOSIS — C34.11 MALIGNANT NEOPLASM OF UPPER LOBE OF RIGHT LUNG (HCC): Primary | ICD-10-CM

## 2020-07-15 DIAGNOSIS — R91.8 MULTIPLE PULMONARY NODULES DETERMINED BY COMPUTED TOMOGRAPHY OF LUNG: ICD-10-CM

## 2020-07-15 DIAGNOSIS — Z78.9 EX-SMOKER FOR LESS THAN 1 YEAR: ICD-10-CM

## 2020-07-15 DIAGNOSIS — C34.91 BRONCHOGENIC CANCER OF RIGHT LUNG (HCC): Primary | ICD-10-CM

## 2020-07-15 PROCEDURE — 99214 OFFICE O/P EST MOD 30 MIN: CPT | Performed by: INTERNAL MEDICINE

## 2020-07-15 NOTE — H&P (VIEW-ONLY)
Thoracic Surgery Consult    Reason for Consultation: lung cancer    Consulting Physician: Ronak Flaherty    Chief Complaint: \"I want surgery for my lung cancer\"    History of Present Illness: Patient is a 77year old, male with PMH COPD and HTN presents today f Laterality Date   • BASIC PPF Left 9/9/2015    Performed by Adonay Ceja MD at [address] [place] [address], POSSIBLE BIOPSY, POSSIBLE POLYPECTOMY [address] N/A 5/6/2013    Performed by Speedy Singer MD at [address] [place] [address] non-distended. Extremities: No clubbing or cyanosis.   Neuro: No gross cranial nerve defects  Psych: oriented to person place and time, normal mood and affect    Review of Data:  I independently reviewed images from CT scan performed on 3/6/20:  CONCLUSION upper lobectomy on 7/27/20. Labs ordered    Mahamed Fowler PA-C  Thoracic Surgery  Pager: [phone]     I have seen and examined that patient and agree with the above assessment and plan.   I have personally reviewed all the pertinent imaging, discussed the

## 2020-07-15 NOTE — TELEPHONE ENCOUNTER
Faxed new order to Hospital Billing with the request to resubmit with new diagnosis. L/m on vm to inform pt.

## 2020-07-15 NOTE — CONSULTS
Thoracic Surgery Consult    Reason for Consultation: lung cancer    Consulting Physician: Ange Finley    Chief Complaint: \"I want surgery for my lung cancer\"    History of Present Illness: Patient is a 77year old, male with PMH COPD and HTN presents today f Laterality Date   • BASIC PPF Left 9/9/2015    Performed by Sonja Delgado MD at [address] [place] [address], POSSIBLE BIOPSY, POSSIBLE POLYPECTOMY [address] N/A 5/6/2013    Performed by Leanna Díaz MD at [address] [place] [address] non-distended. Extremities: No clubbing or cyanosis.   Neuro: No gross cranial nerve defects  Psych: oriented to person place and time, normal mood and affect    Review of Data:  I independently reviewed images from CT scan performed on 3/6/20:  CONCLUSION upper lobectomy on 7/27/20. Labs ordered    Heather Mcclellan PA-C  Thoracic Surgery  Pager: [phone]     I have seen and examined that patient and agree with the above assessment and plan.   I have personally reviewed all the pertinent imaging, discussed the

## 2020-07-20 ENCOUNTER — APPOINTMENT (OUTPATIENT)
Dept: HEMATOLOGY/ONCOLOGY | Facility: HOSPITAL | Age: 75
End: 2020-07-20
Attending: INTERNAL MEDICINE
Payer: MEDICARE

## 2020-07-21 NOTE — PROGRESS NOTES
[facility] Progress Note    Patient Name: Roula Arita   YOB: 1945   Medical Record Number: [id number]   CSN: [id number]   Attending Physician: Lauren Simpson M.D.    Referring Physician: Delmis Acevedo MD      Date of Visit: 7/15/2020 siblings   • Cancer Sister         brain/breast         Psychosocial History:  Social History    Socioeconomic History      Marital status:       Spouse name: Not on file      Number of children: 1      Years of education: Not on file      Highest e Not Asked        Caffeine Concern: Yes          2 cups coffee daily.          Occupational Exposure: Not Asked        Hobby Hazards: Not Asked        Sleep Concern: Not Asked        Stress Concern: Not Asked        Weight Concern: Not Asked        Special D present. No edema. Neurological: Grossly intact. Lymphatics: There is no palpable lymphadenopathy throughout in the cervical, supraclavicular, axillary, or inguinal regions. Psych/Depression: Mood and affect are appropriate.     Laboratory:  Lab Compone CREATININE 1.2 12/19/2012 0731    Creatinine 1.01 08/23/2019 1942    Creatinine 1.13 07/24/2019 0937    Creatinine 1.05 06/28/2016 0701    GFR  109 08/26/2013 0836    GFR  78 12/19/2012 0731    GFR, -American 85 08/23 0.6 08/26/2013 0836    BILIRUBIN, TOTAL 0.5 12/19/2012 0731    Bilirubin, Total 1.2 08/23/2019 1942    Bilirubin, Total 0.8 07/24/2019 0937    Bilirubin, Total 0.8 06/28/2016 0701    Total Protein 8.0 08/23/2019 1942    Total Protein 7.5 07/24/2019 0937 activity, but there is mild FDG uptake SUV maximum 1.50 related to a subtle right lower lobe nodule.   The low level activity, and stability since August 2019 suggest that this is not likely a malignant nodule, but given the 50 pack-year smoking history o CT, CT LUNG LD SCREENING(CPT=), 5/11/2016, 6:13 PM.  [facility] , CT, CT LUNG LD SCREENING(CPT=), 8/09/2019, 8:58 AM.     INDICATIONS:   25-year-old male 50-pack year current smoker. Asymptomatic. Low dose lung screening ().  Personal history of Follow-up to 8/09/19                        Type                        Solid                           Spiculated                  No                              Long Axis                   12.8 mm                         Short Axis Location                    Slice 262 (HTF)                 Status                      follow-up                        Type                        Solid                           Spiculated                  No findings  Follow-up based on abnormality     LungRAD scores of 3, 4A, and 4B will be reviewed in the Multidisciplinary Thoracic Oncology Clinic           Dictated by: Camryn Lopez MD on 3/07/2020 at 12:14 PM       Finalized by: Camryn Lopez MD on 3/07/ ==========================================================  4    Comment                                                     Segment/Lobe                Right Upper Lobe                Location                    Slice 63 (HTF)                  Sta Category          2                          ----------------------------------------------------------  3    Comment                                                     Segment/Lobe                Left Lower Lobe                 Location with other suspicious features    S  Other potentially significant findings  Follow-up based on abnormality     LungRAD scores of 3, 4A, and 4B will be reviewed in the Multidisciplinary Thoracic Oncology Clinic           Dictated by: Niki Serrano MD on 8/09/

## 2020-07-24 ENCOUNTER — HOSPITAL ENCOUNTER (OUTPATIENT)
Dept: LAB | Facility: HOSPITAL | Age: 75
Discharge: HOME | DRG: 164 | End: 2020-07-24
Attending: PHYSICIAN ASSISTANT
Payer: MEDICARE

## 2020-07-24 ENCOUNTER — HOSPITAL ENCOUNTER (OUTPATIENT)
Dept: CV DIAGNOSTICS | Facility: HOSPITAL | Age: 75
Discharge: HOME | DRG: 164 | End: 2020-07-24
Attending: PHYSICIAN ASSISTANT
Payer: MEDICARE

## 2020-07-24 DIAGNOSIS — C34.11 MALIGNANT NEOPLASM OF UPPER LOBE OF RIGHT LUNG (HCC): ICD-10-CM

## 2020-07-24 LAB
ANION GAP SERPL CALC-SCNC: 4 MMOL/L (ref 0–18)
ANTIBODY SCREEN: NEGATIVE
ATRIAL RATE: 81 BPM
BASOPHILS # BLD AUTO: 0.04 X10(3) UL (ref 0–0.2)
BASOPHILS NFR BLD AUTO: 0.4 %
BUN BLD-MCNC: 16 MG/DL (ref 7–18)
BUN/CREAT SERPL: 13.8 (ref 10–20)
CALCIUM BLD-MCNC: 9.1 MG/DL (ref 8.5–10.1)
CHLORIDE SERPL-SCNC: 108 MMOL/L (ref 98–112)
CO2 SERPL-SCNC: 26 MMOL/L (ref 21–32)
CREAT BLD-MCNC: 1.16 MG/DL (ref 0.7–1.3)
DEPRECATED RDW RBC AUTO: 44 FL (ref 35.1–46.3)
EOSINOPHIL # BLD AUTO: 0.39 X10(3) UL (ref 0–0.7)
EOSINOPHIL NFR BLD AUTO: 4.1 %
ERYTHROCYTE [DISTWIDTH] IN BLOOD BY AUTOMATED COUNT: 13.1 % (ref 11–15)
GLUCOSE BLD-MCNC: 113 MG/DL (ref 70–99)
HCT VFR BLD AUTO: 42.7 % (ref 39–53)
HGB BLD-MCNC: 14.4 G/DL (ref 13–17.5)
IMM GRANULOCYTES # BLD AUTO: 0.03 X10(3) UL (ref 0–1)
IMM GRANULOCYTES NFR BLD: 0.3 %
LYMPHOCYTES # BLD AUTO: 3.02 X10(3) UL (ref 1–4)
LYMPHOCYTES NFR BLD AUTO: 32 %
MCH RBC QN AUTO: 30.8 PG (ref 26–34)
MCHC RBC AUTO-ENTMCNC: 33.7 G/DL (ref 31–37)
MCV RBC AUTO: 91.2 FL (ref 80–100)
MONOCYTES # BLD AUTO: 0.87 X10(3) UL (ref 0.1–1)
MONOCYTES NFR BLD AUTO: 9.2 %
NEUTROPHILS # BLD AUTO: 5.09 X10 (3) UL (ref 1.5–7.7)
NEUTROPHILS # BLD AUTO: 5.09 X10(3) UL (ref 1.5–7.7)
NEUTROPHILS NFR BLD AUTO: 54 %
OSMOLALITY SERPL CALC.SUM OF ELEC: 288 MOSM/KG (ref 275–295)
P AXIS: 50 DEGREES
P-R INTERVAL: 198 MS
PATIENT FASTING Y/N/NP: YES
PLATELET # BLD AUTO: 205 10(3)UL (ref 150–450)
POTASSIUM SERPL-SCNC: 3.8 MMOL/L (ref 3.5–5.1)
Q-T INTERVAL: 380 MS
QRS DURATION: 84 MS
QTC CALCULATION (BEZET): 441 MS
R AXIS: 52 DEGREES
RBC # BLD AUTO: 4.68 X10(6)UL (ref 3.8–5.8)
RH BLOOD TYPE: POSITIVE
SODIUM SERPL-SCNC: 138 MMOL/L (ref 136–145)
T AXIS: 70 DEGREES
VENTRICULAR RATE: 81 BPM
WBC # BLD AUTO: 9.4 X10(3) UL (ref 4–11)

## 2020-07-24 PROCEDURE — 85025 COMPLETE CBC W/AUTO DIFF WBC: CPT | Performed by: PHYSICIAN ASSISTANT

## 2020-07-24 PROCEDURE — 80048 BASIC METABOLIC PNL TOTAL CA: CPT | Performed by: PHYSICIAN ASSISTANT

## 2020-07-24 PROCEDURE — 86901 BLOOD TYPING SEROLOGIC RH(D): CPT | Performed by: PHYSICIAN ASSISTANT

## 2020-07-24 PROCEDURE — 93010 ELECTROCARDIOGRAM REPORT: CPT | Performed by: INTERNAL MEDICINE

## 2020-07-24 PROCEDURE — 86900 BLOOD TYPING SEROLOGIC ABO: CPT | Performed by: PHYSICIAN ASSISTANT

## 2020-07-24 PROCEDURE — 93005 ELECTROCARDIOGRAM TRACING: CPT

## 2020-07-24 PROCEDURE — 36415 COLL VENOUS BLD VENIPUNCTURE: CPT | Performed by: PHYSICIAN ASSISTANT

## 2020-07-24 PROCEDURE — 86850 RBC ANTIBODY SCREEN: CPT | Performed by: PHYSICIAN ASSISTANT

## 2020-07-25 ENCOUNTER — ANESTHESIA EVENT (OUTPATIENT)
Dept: CARDIAC SURGERY | Facility: HOSPITAL | Age: 75
DRG: 164 | End: 2020-07-25
Payer: MEDICARE

## 2020-07-25 ENCOUNTER — LAB ENCOUNTER (OUTPATIENT)
Dept: LAB | Facility: HOSPITAL | Age: 75
DRG: 164 | End: 2020-07-25
Attending: THORACIC SURGERY (CARDIOTHORACIC VASCULAR SURGERY)
Payer: MEDICARE

## 2020-07-25 DIAGNOSIS — Z01.818 PREOP TESTING: ICD-10-CM

## 2020-07-26 LAB — SARS-COV-2 RNA RESP QL NAA+PROBE: NOT DETECTED

## 2020-07-27 ENCOUNTER — ANESTHESIA (OUTPATIENT)
Dept: CARDIAC SURGERY | Facility: HOSPITAL | Age: 75
DRG: 164 | End: 2020-07-27
Payer: MEDICARE

## 2020-07-27 ENCOUNTER — HOSPITAL ENCOUNTER (INPATIENT)
Facility: HOSPITAL | Age: 75
LOS: 1 days | Discharge: HOME | DRG: 164 | End: 2020-07-28
Attending: THORACIC SURGERY (CARDIOTHORACIC VASCULAR SURGERY) | Admitting: THORACIC SURGERY (CARDIOTHORACIC VASCULAR SURGERY)
Payer: MEDICARE

## 2020-07-27 ENCOUNTER — APPOINTMENT (OUTPATIENT)
Dept: GENERAL RADIOLOGY | Facility: HOSPITAL | Age: 75
DRG: 164 | End: 2020-07-27
Attending: INTERNAL MEDICINE
Payer: MEDICARE

## 2020-07-27 DIAGNOSIS — J44.9 CHRONIC OBSTRUCTIVE PULMONARY DISEASE, UNSPECIFIED COPD TYPE (HCC): Chronic | ICD-10-CM

## 2020-07-27 DIAGNOSIS — Z01.818 PREOP TESTING: Primary | ICD-10-CM

## 2020-07-27 LAB
ARTERIAL BLD GAS O2 SATURATION: 96 % (ref 92–100)
ARTERIAL BLOOD GAS BASE EXCESS: -3.9 MMOL/L (ref ?–2)
ARTERIAL BLOOD GAS HCO3: 22.8 MEQ/L (ref 22–26)
ARTERIAL BLOOD GAS PCO2: 46 MM HG (ref 35–45)
ARTERIAL BLOOD GAS PH: 7.31 (ref 7.35–7.45)
ARTERIAL BLOOD GAS PO2: 109 MM HG (ref 80–105)
BUN BLD-MCNC: 16 MG/DL (ref 7–18)
CALCIUM BLD-MCNC: 8.4 MG/DL (ref 8.5–10.1)
CALCULATED O2 SATURATION: 98 % (ref 92–100)
CARBOXYHEMOGLOBIN: 1.3 % SAT (ref 0–3)
CHLORIDE SERPL-SCNC: 108 MMOL/L (ref 98–112)
CO2 SERPL-SCNC: 25 MMOL/L (ref 21–32)
CREAT BLD-MCNC: 1.01 MG/DL (ref 0.7–1.3)
DEPRECATED RDW RBC AUTO: 44.4 FL (ref 35.1–46.3)
ERYTHROCYTE [DISTWIDTH] IN BLOOD BY AUTOMATED COUNT: 13 % (ref 11–15)
GLUCOSE BLD-MCNC: 109 MG/DL (ref 70–99)
GLUCOSE BLD-MCNC: 169 MG/DL (ref 70–99)
HAV IGM SER QL: 2 MG/DL (ref 1.6–2.6)
HCT VFR BLD AUTO: 39.7 % (ref 39–53)
HGB BLD-MCNC: 13 G/DL (ref 13–17.5)
IONIZED CALCIUM: 1.13 MMOL/L (ref 1.12–1.32)
ISTAT ACTIVATED CLOTTING TIME: 142 SECONDS (ref 74–137)
ISTAT BLOOD GAS BASE EXCESS: 4 MMOL/L (ref ?–30)
ISTAT BLOOD GAS HCO3: 29.8 MEQ/L (ref 22–26)
ISTAT BLOOD GAS O2 SATURATION: 100 % (ref 92–100)
ISTAT BLOOD GAS PCO2: 57.4 MMHG (ref 35–45)
ISTAT BLOOD GAS PH: 7.32 (ref 7.35–7.45)
ISTAT BLOOD GAS PO2: 354 MMHG (ref 80–105)
ISTAT BLOOD GAS TCO2: 32 MMOL/L (ref 22–32)
ISTAT HEMATOCRIT: 41 % (ref 37–53)
ISTAT IONIZED CALCIUM: 1.26 MMOL/L (ref 1.12–1.32)
ISTAT POTASSIUM: 4.4 MMOL/L (ref 3.6–5.1)
ISTAT SODIUM: 139 MMOL/L (ref 136–145)
L/M: 6 L/MIN
LACTIC ACID ARTERIAL: <1.6 MMOL/L (ref 0.5–2)
MCH RBC QN AUTO: 30.4 PG (ref 26–34)
MCHC RBC AUTO-ENTMCNC: 32.7 G/DL (ref 31–37)
MCV RBC AUTO: 93 FL (ref 80–100)
METHEMOGLOBIN: 0.6 % SAT (ref 0.4–1.5)
P/F RATIO: 548.4 MMHG
PATIENT TEMPERATURE: 96.8 F
PLATELET # BLD AUTO: 207 10(3)UL (ref 150–450)
POTASSIUM BLOOD GAS: 4.3 MMOL/L (ref 3.6–5.1)
POTASSIUM SERPL-SCNC: 4.4 MMOL/L (ref 3.5–5.1)
RBC # BLD AUTO: 4.27 X10(6)UL (ref 3.8–5.8)
SODIUM BLOOD GAS: 140 MMOL/L (ref 136–144)
SODIUM SERPL-SCNC: 138 MMOL/L (ref 136–145)
TOTAL HEMOGLOBIN: 13.1 G/DL (ref 12.6–17.4)
WBC # BLD AUTO: 20.5 X10(3) UL (ref 4–11)

## 2020-07-27 PROCEDURE — 07B74ZX EXCISION OF THORAX LYMPHATIC, PERCUTANEOUS ENDOSCOPIC APPROACH, DIAGNOSTIC: ICD-10-PCS | Performed by: THORACIC SURGERY (CARDIOTHORACIC VASCULAR SURGERY)

## 2020-07-27 PROCEDURE — 0BTC4ZZ RESECTION OF RIGHT UPPER LUNG LOBE, PERCUTANEOUS ENDOSCOPIC APPROACH: ICD-10-PCS | Performed by: THORACIC SURGERY (CARDIOTHORACIC VASCULAR SURGERY)

## 2020-07-27 PROCEDURE — 3E0T3BZ INTRODUCTION OF ANESTHETIC AGENT INTO PERIPHERAL NERVES AND PLEXI, PERCUTANEOUS APPROACH: ICD-10-PCS | Performed by: THORACIC SURGERY (CARDIOTHORACIC VASCULAR SURGERY)

## 2020-07-27 PROCEDURE — 71045 X-RAY EXAM CHEST 1 VIEW: CPT | Performed by: INTERNAL MEDICINE

## 2020-07-27 PROCEDURE — 99223 1ST HOSP IP/OBS HIGH 75: CPT | Performed by: HOSPITALIST

## 2020-07-27 PROCEDURE — 0BJ08ZZ INSPECTION OF TRACHEOBRONCHIAL TREE, VIA NATURAL OR ARTIFICIAL OPENING ENDOSCOPIC: ICD-10-PCS | Performed by: THORACIC SURGERY (CARDIOTHORACIC VASCULAR SURGERY)

## 2020-07-27 RX ORDER — METOCLOPRAMIDE HYDROCHLORIDE 5 MG/ML
10 INJECTION INTRAMUSCULAR; INTRAVENOUS AS NEEDED
Status: ACTIVE | OUTPATIENT
Start: 2020-07-27 | End: 2020-07-27

## 2020-07-27 RX ORDER — OXYCODONE HYDROCHLORIDE 5 MG/1
10 TABLET ORAL EVERY 4 HOURS PRN
Status: DISCONTINUED | OUTPATIENT
Start: 2020-07-27 | End: 2020-07-28

## 2020-07-27 RX ORDER — MIDAZOLAM HYDROCHLORIDE 1 MG/ML
1 INJECTION INTRAMUSCULAR; INTRAVENOUS EVERY 5 MIN PRN
Status: ACTIVE | OUTPATIENT
Start: 2020-07-27 | End: 2020-07-27

## 2020-07-27 RX ORDER — BUPIVACAINE HYDROCHLORIDE 2.5 MG/ML
INJECTION, SOLUTION EPIDURAL; INFILTRATION; INTRACAUDAL AS NEEDED
Status: DISCONTINUED | OUTPATIENT
Start: 2020-07-27 | End: 2020-07-27 | Stop reason: HOSPADM

## 2020-07-27 RX ORDER — BISACODYL 10 MG
10 SUPPOSITORY, RECTAL RECTAL
Status: DISCONTINUED | OUTPATIENT
Start: 2020-07-27 | End: 2020-07-28

## 2020-07-27 RX ORDER — SODIUM CHLORIDE 9 MG/ML
INJECTION, SOLUTION INTRAVENOUS CONTINUOUS
Status: DISCONTINUED | OUTPATIENT
Start: 2020-07-27 | End: 2020-07-28

## 2020-07-27 RX ORDER — HYDROMORPHONE HYDROCHLORIDE 1 MG/ML
0.4 INJECTION, SOLUTION INTRAMUSCULAR; INTRAVENOUS; SUBCUTANEOUS EVERY 5 MIN PRN
Status: ACTIVE | OUTPATIENT
Start: 2020-07-27 | End: 2020-07-27

## 2020-07-27 RX ORDER — MORPHINE SULFATE 4 MG/ML
4 INJECTION, SOLUTION INTRAMUSCULAR; INTRAVENOUS EVERY 4 HOURS PRN
Status: DISCONTINUED | OUTPATIENT
Start: 2020-07-27 | End: 2020-07-28

## 2020-07-27 RX ORDER — ALBUTEROL SULFATE 90 UG/1
2 AEROSOL, METERED RESPIRATORY (INHALATION) EVERY 4 HOURS PRN
Status: DISCONTINUED | OUTPATIENT
Start: 2020-07-27 | End: 2020-07-28

## 2020-07-27 RX ORDER — PHENYLEPHRINE HCL 10 MG/ML
VIAL (ML) INJECTION AS NEEDED
Status: DISCONTINUED | OUTPATIENT
Start: 2020-07-27 | End: 2020-07-27 | Stop reason: SURG

## 2020-07-27 RX ORDER — MORPHINE SULFATE 4 MG/ML
6 INJECTION, SOLUTION INTRAMUSCULAR; INTRAVENOUS EVERY 4 HOURS PRN
Status: DISCONTINUED | OUTPATIENT
Start: 2020-07-27 | End: 2020-07-28

## 2020-07-27 RX ORDER — IPRATROPIUM BROMIDE AND ALBUTEROL SULFATE 2.5; .5 MG/3ML; MG/3ML
3 SOLUTION RESPIRATORY (INHALATION)
Status: DISCONTINUED | OUTPATIENT
Start: 2020-07-27 | End: 2020-07-28

## 2020-07-27 RX ORDER — SODIUM CHLORIDE, SODIUM LACTATE, POTASSIUM CHLORIDE, CALCIUM CHLORIDE 600; 310; 30; 20 MG/100ML; MG/100ML; MG/100ML; MG/100ML
INJECTION, SOLUTION INTRAVENOUS CONTINUOUS
Status: DISCONTINUED | OUTPATIENT
Start: 2020-07-27 | End: 2020-07-27

## 2020-07-27 RX ORDER — IPRATROPIUM BROMIDE AND ALBUTEROL SULFATE 2.5; .5 MG/3ML; MG/3ML
3 SOLUTION RESPIRATORY (INHALATION)
Status: DISCONTINUED | OUTPATIENT
Start: 2020-07-27 | End: 2020-07-27

## 2020-07-27 RX ORDER — ONDANSETRON 2 MG/ML
4 INJECTION INTRAMUSCULAR; INTRAVENOUS AS NEEDED
Status: ACTIVE | OUTPATIENT
Start: 2020-07-27 | End: 2020-07-27

## 2020-07-27 RX ORDER — OXYCODONE HYDROCHLORIDE 5 MG/1
5 TABLET ORAL EVERY 4 HOURS PRN
Status: DISCONTINUED | OUTPATIENT
Start: 2020-07-27 | End: 2020-07-28

## 2020-07-27 RX ORDER — CALCIUM CARBONATE 200(500)MG
1000 TABLET,CHEWABLE ORAL 3 TIMES DAILY PRN
Status: DISCONTINUED | OUTPATIENT
Start: 2020-07-27 | End: 2020-07-28

## 2020-07-27 RX ORDER — DEXAMETHASONE SODIUM PHOSPHATE 4 MG/ML
VIAL (ML) INJECTION AS NEEDED
Status: DISCONTINUED | OUTPATIENT
Start: 2020-07-27 | End: 2020-07-27 | Stop reason: SURG

## 2020-07-27 RX ORDER — ONDANSETRON 2 MG/ML
INJECTION INTRAMUSCULAR; INTRAVENOUS AS NEEDED
Status: DISCONTINUED | OUTPATIENT
Start: 2020-07-27 | End: 2020-07-27 | Stop reason: SURG

## 2020-07-27 RX ORDER — POLYETHYLENE GLYCOL 3350 17 G/17G
17 POWDER, FOR SOLUTION ORAL DAILY
Status: DISCONTINUED | OUTPATIENT
Start: 2020-07-27 | End: 2020-07-28

## 2020-07-27 RX ORDER — ACETAMINOPHEN 10 MG/ML
1000 INJECTION, SOLUTION INTRAVENOUS EVERY 6 HOURS
Status: DISCONTINUED | OUTPATIENT
Start: 2020-07-27 | End: 2020-07-28

## 2020-07-27 RX ORDER — ENOXAPARIN SODIUM 100 MG/ML
40 INJECTION SUBCUTANEOUS DAILY
Status: DISCONTINUED | OUTPATIENT
Start: 2020-07-27 | End: 2020-07-28

## 2020-07-27 RX ORDER — SODIUM CHLORIDE 9 MG/ML
INJECTION, SOLUTION INTRAVENOUS CONTINUOUS PRN
Status: DISCONTINUED | OUTPATIENT
Start: 2020-07-27 | End: 2020-07-27 | Stop reason: SURG

## 2020-07-27 RX ORDER — MORPHINE SULFATE 4 MG/ML
2 INJECTION, SOLUTION INTRAMUSCULAR; INTRAVENOUS EVERY 4 HOURS PRN
Status: DISCONTINUED | OUTPATIENT
Start: 2020-07-27 | End: 2020-07-28

## 2020-07-27 RX ORDER — NALOXONE HYDROCHLORIDE 0.4 MG/ML
80 INJECTION, SOLUTION INTRAMUSCULAR; INTRAVENOUS; SUBCUTANEOUS AS NEEDED
Status: ACTIVE | OUTPATIENT
Start: 2020-07-27 | End: 2020-07-27

## 2020-07-27 RX ORDER — KETOROLAC TROMETHAMINE 15 MG/ML
30 INJECTION, SOLUTION INTRAMUSCULAR; INTRAVENOUS ONCE
Status: COMPLETED | OUTPATIENT
Start: 2020-07-27 | End: 2020-07-27

## 2020-07-27 RX ORDER — SODIUM CHLORIDE, SODIUM LACTATE, POTASSIUM CHLORIDE, CALCIUM CHLORIDE 600; 310; 30; 20 MG/100ML; MG/100ML; MG/100ML; MG/100ML
INJECTION, SOLUTION INTRAVENOUS CONTINUOUS PRN
Status: DISCONTINUED | OUTPATIENT
Start: 2020-07-27 | End: 2020-07-27 | Stop reason: SURG

## 2020-07-27 RX ORDER — DOCUSATE SODIUM 100 MG/1
100 CAPSULE, LIQUID FILLED ORAL 2 TIMES DAILY PRN
Status: DISCONTINUED | OUTPATIENT
Start: 2020-07-27 | End: 2020-07-28

## 2020-07-27 RX ORDER — CEFAZOLIN SODIUM/WATER 2 G/20 ML
SYRINGE (ML) INTRAVENOUS AS NEEDED
Status: DISCONTINUED | OUTPATIENT
Start: 2020-07-27 | End: 2020-07-27 | Stop reason: SURG

## 2020-07-27 RX ORDER — ONDANSETRON 2 MG/ML
4 INJECTION INTRAMUSCULAR; INTRAVENOUS EVERY 6 HOURS PRN
Status: DISCONTINUED | OUTPATIENT
Start: 2020-07-27 | End: 2020-07-28

## 2020-07-27 RX ORDER — KETOROLAC TROMETHAMINE 30 MG/ML
15 INJECTION, SOLUTION INTRAMUSCULAR; INTRAVENOUS EVERY 6 HOURS
Status: DISCONTINUED | OUTPATIENT
Start: 2020-07-27 | End: 2020-07-28

## 2020-07-27 RX ORDER — ROCURONIUM BROMIDE 10 MG/ML
INJECTION, SOLUTION INTRAVENOUS AS NEEDED
Status: DISCONTINUED | OUTPATIENT
Start: 2020-07-27 | End: 2020-07-27 | Stop reason: SURG

## 2020-07-27 RX ORDER — MIDAZOLAM HYDROCHLORIDE 1 MG/ML
INJECTION INTRAMUSCULAR; INTRAVENOUS AS NEEDED
Status: DISCONTINUED | OUTPATIENT
Start: 2020-07-27 | End: 2020-07-27 | Stop reason: SURG

## 2020-07-27 RX ORDER — CEFAZOLIN SODIUM/WATER 2 G/20 ML
2 SYRINGE (ML) INTRAVENOUS EVERY 8 HOURS
Status: COMPLETED | OUTPATIENT
Start: 2020-07-27 | End: 2020-07-28

## 2020-07-27 RX ORDER — LIDOCAINE HYDROCHLORIDE 10 MG/ML
INJECTION, SOLUTION EPIDURAL; INFILTRATION; INTRACAUDAL; PERINEURAL AS NEEDED
Status: DISCONTINUED | OUTPATIENT
Start: 2020-07-27 | End: 2020-07-27 | Stop reason: SURG

## 2020-07-27 RX ORDER — SODIUM PHOSPHATE, DIBASIC AND SODIUM PHOSPHATE, MONOBASIC 7; 19 G/133ML; G/133ML
1 ENEMA RECTAL ONCE AS NEEDED
Status: DISCONTINUED | OUTPATIENT
Start: 2020-07-27 | End: 2020-07-28

## 2020-07-27 RX ORDER — SODIUM CHLORIDE 0.9 % (FLUSH) 0.9 %
10 SYRINGE (ML) INJECTION AS NEEDED
Status: DISCONTINUED | OUTPATIENT
Start: 2020-07-27 | End: 2020-07-28

## 2020-07-27 RX ADMIN — ROCURONIUM BROMIDE 25 MG: 10 INJECTION, SOLUTION INTRAVENOUS at 11:05:00

## 2020-07-27 RX ADMIN — PHENYLEPHRINE HCL 100 MCG: 10 MG/ML VIAL (ML) INJECTION at 12:23:00

## 2020-07-27 RX ADMIN — ONDANSETRON 4 MG: 2 INJECTION INTRAMUSCULAR; INTRAVENOUS at 12:59:00

## 2020-07-27 RX ADMIN — PHENYLEPHRINE HCL 100 MCG: 10 MG/ML VIAL (ML) INJECTION at 12:12:00

## 2020-07-27 RX ADMIN — LIDOCAINE HYDROCHLORIDE 50 MG: 10 INJECTION, SOLUTION EPIDURAL; INFILTRATION; INTRACAUDAL; PERINEURAL at 10:31:00

## 2020-07-27 RX ADMIN — MIDAZOLAM HYDROCHLORIDE 2 MG: 1 INJECTION INTRAMUSCULAR; INTRAVENOUS at 10:28:00

## 2020-07-27 RX ADMIN — ROCURONIUM BROMIDE 50 MG: 10 INJECTION, SOLUTION INTRAVENOUS at 10:31:00

## 2020-07-27 RX ADMIN — ROCURONIUM BROMIDE 5 MG: 10 INJECTION, SOLUTION INTRAVENOUS at 12:27:00

## 2020-07-27 RX ADMIN — DEXAMETHASONE SODIUM PHOSPHATE 8 MG: 4 MG/ML VIAL (ML) INJECTION at 12:40:00

## 2020-07-27 RX ADMIN — SODIUM CHLORIDE: 9 INJECTION, SOLUTION INTRAVENOUS at 10:28:00

## 2020-07-27 RX ADMIN — PHENYLEPHRINE HCL 100 MCG: 10 MG/ML VIAL (ML) INJECTION at 11:25:00

## 2020-07-27 RX ADMIN — SODIUM CHLORIDE, SODIUM LACTATE, POTASSIUM CHLORIDE, CALCIUM CHLORIDE: 600; 310; 30; 20 INJECTION, SOLUTION INTRAVENOUS at 10:28:00

## 2020-07-27 RX ADMIN — ROCURONIUM BROMIDE 10 MG: 10 INJECTION, SOLUTION INTRAVENOUS at 12:44:00

## 2020-07-27 RX ADMIN — CEFAZOLIN SODIUM/WATER 2 G: 2 G/20 ML SYRINGE (ML) INTRAVENOUS at 10:48:00

## 2020-07-27 RX ADMIN — PHENYLEPHRINE HCL 100 MCG: 10 MG/ML VIAL (ML) INJECTION at 11:42:00

## 2020-07-27 RX ADMIN — ROCURONIUM BROMIDE 20 MG: 10 INJECTION, SOLUTION INTRAVENOUS at 11:48:00

## 2020-07-27 NOTE — PROGRESS NOTES
Received pt from [facility] s/p VATS chest tube to water seal. No air leak noted. Incisions are C/D/I no drainage. Pt denies any pain. Zofran given for nausea. Salomon Taylor and Gary at bedside, orders received and carried out.

## 2020-07-27 NOTE — ANESTHESIA POSTPROCEDURE EVALUATION
[facility] Patient Status:  Inpatient   Age/Gender 77year old male MRN [id number]   [facility] 6NE-A Attending Thi Waggoner, Vilma Gil MD   Psychiatric Day # 0 PCP Kate Cruz MD       Anesthesia Post-op Note    Procedure

## 2020-07-27 NOTE — CONSULTS
[facility]  Report of Consultation    Stacie Elliott Patient Status:  Inpatient    1945 MRN [id number]   [facility] 6NE-A Attending Sylvia Luther., Jared Mccall MD   Hosp Day # 0 PCP Chang Miguel MD     Reason for Consultation:  In [place], [facility]   • INGUINAL HERNIA REPAIR       Family History   Problem Relation Age of Onset   • Other (Brain tumor) Mother    • Other (Bleeding ulcer) Father    • Other (Healthy) Other         3 siblings   • Cancer Sister         brain/breast      reports atraumatic. Eyes: Conjunctivae/corneas clear. No scleral icterus. No conjunctival     hemorrhage. Nose: Nares normal.   Throat: Lips, mucosa, and tongue normal.  No thrush noted.    Neck: Soft, supple neck; trachea midline, no adenopathy, no thyromega

## 2020-07-27 NOTE — CONSULTS
EMILY HOSPITALIST  [address] [place] [address] Patient Status:  Inpatient    1945 MRN [id number]   [facility] 6NE-A Attending Pavan Alexander., Evert Garces MD   Hosp Day # 0 PCP Clemmie Runner, MD     Reason for consult: med management encounter. Budesonide-Formoterol Fumarate (SYMBICORT) 160-4.5 MCG/ACT Inhalation Aerosol, Inhale 2 puffs into the lungs 2 (two) times daily. , Disp: 3 Inhaler, Rfl: 1  Albuterol Sulfate HFA (VENTOLIN HFA) 108 (90 Base) MCG/ACT Inhalation Aero Soln, Inhale 62   CA 9.1      K 3.8      CO2 26.0       No results for input(s): PTP, INR in the last 168 hours. No results for input(s): TROP, CK in the last 168 hours. Imaging: Imaging data reviewed in Epic. ASSESSMENT / PLAN:     1.  Lung CA s/

## 2020-07-27 NOTE — ANESTHESIA PREPROCEDURE EVALUATION
PRE-OP EVALUATION    Patient Name: Lola Grandchild    Pre-op Diagnosis: lung cancer    Procedure(s):  Flexible bronchoscopy, right video assisted upper lobectomy, lymph node dissection    Surgeon(s) and Role:     * Selina Stallings MD - Primary Baron Kenny MD at [address] [place] [address]   • COLONOSCOPY, POSSIBLE BIOPSY, POSSIBLE POLYPECTOMY 85708 N/A 5/6/2013    Performed by Manpreet Acosta MD at [address] [place] [address]       Social History    Tobacco Use      Smoking stat products. Present on Admission:  **None**        Discussed general anesthesia with double lumen endotracheal tube with patient. Discussed risk of oral/dental trauma, nausea, sore throat, rare allergic reactions.  We discussed placement of 2nd larg

## 2020-07-27 NOTE — ANESTHESIA PROCEDURE NOTES
Arterial Line  Performed by: Elza Barton MD  Authorized by: Elza Barton MD     General Information and Staff    Procedure Start:  7/27/2020 10:30 AM  Procedure End:  7/27/2020 10:32 AM  Anesthesiologist:  Elza Barton MD  Perfor

## 2020-07-27 NOTE — INTERVAL H&P NOTE
Pre-op Diagnosis: lung cancer    The above referenced H&P was reviewed by Verena Dorman PA-C on 7/27/2020, the patient was examined and no significant changes have occurred in the patient's condition since the H&P was performed.   I discussed with the patient

## 2020-07-27 NOTE — BRIEF OP NOTE
Pre-Operative Diagnosis: lung cancer     Post-Operative Diagnosis: lung cancer      Procedure Performed:   Procedure(s):  Flexible bronchoscopy, right video assisted upper lobectomy, lymph node dissection    Surgeon(s) and Role:     * Bernie Larry Ace

## 2020-07-27 NOTE — ANESTHESIA PROCEDURE NOTES
Airway  Date/Time: 7/27/2020 10:35 AM  Urgency: elective    Airway not difficult    General Information and Staff    Patient location during procedure: OR  Anesthesiologist: Guerita Hollingsworth MD  Performed: anesthesiologist     Indications and Patient C

## 2020-07-27 NOTE — ANESTHESIA PROCEDURE NOTES
Peripheral IV  Date/Time: 7/27/2020 10:35 AM  Inserted by: Ziggy Murray MD    Placement  Needle size: 18 G  Laterality: right  Location: arm  Site prep: chlorhexidine  Technique: anatomical landmarks  Attempts: 1

## 2020-07-28 ENCOUNTER — APPOINTMENT (OUTPATIENT)
Dept: GENERAL RADIOLOGY | Facility: HOSPITAL | Age: 75
DRG: 164 | End: 2020-07-28
Attending: INTERNAL MEDICINE
Payer: MEDICARE

## 2020-07-28 VITALS
OXYGEN SATURATION: 91 % | DIASTOLIC BLOOD PRESSURE: 60 MMHG | HEIGHT: 71.75 IN | TEMPERATURE: 99 F | HEART RATE: 88 BPM | RESPIRATION RATE: 19 BRPM | BODY MASS INDEX: 27.35 KG/M2 | WEIGHT: 199.75 LBS | SYSTOLIC BLOOD PRESSURE: 147 MMHG

## 2020-07-28 LAB
ALBUMIN SERPL-MCNC: 3.2 G/DL (ref 3.4–5)
ALBUMIN/GLOB SERPL: 1.1 {RATIO} (ref 1–2)
ALP LIVER SERPL-CCNC: 66 U/L (ref 45–117)
ALT SERPL-CCNC: 24 U/L (ref 16–61)
ANION GAP SERPL CALC-SCNC: 5 MMOL/L (ref 0–18)
AST SERPL-CCNC: 18 U/L (ref 15–37)
BASOPHILS # BLD AUTO: 0.01 X10(3) UL (ref 0–0.2)
BASOPHILS NFR BLD AUTO: 0.1 %
BILIRUB SERPL-MCNC: 0.4 MG/DL (ref 0.1–2)
BUN BLD-MCNC: 19 MG/DL (ref 7–18)
BUN/CREAT SERPL: 15.7 (ref 10–20)
CALCIUM BLD-MCNC: 8.1 MG/DL (ref 8.5–10.1)
CHLORIDE SERPL-SCNC: 107 MMOL/L (ref 98–112)
CO2 SERPL-SCNC: 25 MMOL/L (ref 21–32)
CREAT BLD-MCNC: 1.21 MG/DL (ref 0.7–1.3)
DEPRECATED RDW RBC AUTO: 44 FL (ref 35.1–46.3)
EOSINOPHIL # BLD AUTO: 0 X10(3) UL (ref 0–0.7)
EOSINOPHIL NFR BLD AUTO: 0 %
ERYTHROCYTE [DISTWIDTH] IN BLOOD BY AUTOMATED COUNT: 13 % (ref 11–15)
EST. AVERAGE GLUCOSE BLD GHB EST-MCNC: 114 MG/DL (ref 68–126)
GLOBULIN PLAS-MCNC: 3 G/DL (ref 2.8–4.4)
GLUCOSE BLD-MCNC: 164 MG/DL (ref 70–99)
HAV IGM SER QL: 2 MG/DL (ref 1.6–2.6)
HBA1C MFR BLD HPLC: 5.6 % (ref ?–5.7)
HCT VFR BLD AUTO: 35.3 % (ref 39–53)
HGB BLD-MCNC: 11.9 G/DL (ref 13–17.5)
IMM GRANULOCYTES # BLD AUTO: 0.05 X10(3) UL (ref 0–1)
IMM GRANULOCYTES NFR BLD: 0.4 %
LYMPHOCYTES # BLD AUTO: 1.24 X10(3) UL (ref 1–4)
LYMPHOCYTES NFR BLD AUTO: 9.1 %
M PROTEIN MFR SERPL ELPH: 6.2 G/DL (ref 6.4–8.2)
MCH RBC QN AUTO: 31.2 PG (ref 26–34)
MCHC RBC AUTO-ENTMCNC: 33.7 G/DL (ref 31–37)
MCV RBC AUTO: 92.4 FL (ref 80–100)
MONOCYTES # BLD AUTO: 0.92 X10(3) UL (ref 0.1–1)
MONOCYTES NFR BLD AUTO: 6.7 %
NEUTROPHILS # BLD AUTO: 11.41 X10 (3) UL (ref 1.5–7.7)
NEUTROPHILS # BLD AUTO: 11.41 X10(3) UL (ref 1.5–7.7)
NEUTROPHILS NFR BLD AUTO: 83.7 %
OSMOLALITY SERPL CALC.SUM OF ELEC: 290 MOSM/KG (ref 275–295)
PHOSPHATE SERPL-MCNC: 3.6 MG/DL (ref 2.5–4.9)
PLATELET # BLD AUTO: 189 10(3)UL (ref 150–450)
POTASSIUM SERPL-SCNC: 4.2 MMOL/L (ref 3.5–5.1)
RBC # BLD AUTO: 3.82 X10(6)UL (ref 3.8–5.8)
SODIUM SERPL-SCNC: 137 MMOL/L (ref 136–145)
WBC # BLD AUTO: 13.6 X10(3) UL (ref 4–11)

## 2020-07-28 PROCEDURE — 99232 SBSQ HOSP IP/OBS MODERATE 35: CPT | Performed by: HOSPITALIST

## 2020-07-28 PROCEDURE — 71045 X-RAY EXAM CHEST 1 VIEW: CPT | Performed by: INTERNAL MEDICINE

## 2020-07-28 RX ORDER — OXYCODONE HYDROCHLORIDE 5 MG/1
5 TABLET ORAL EVERY 4 HOURS PRN
Qty: 30 TABLET | Refills: 0 | Status: SHIPPED | OUTPATIENT
Start: 2020-07-28 | End: 2020-08-13 | Stop reason: ALTCHOICE

## 2020-07-28 RX ORDER — IPRATROPIUM BROMIDE AND ALBUTEROL SULFATE 2.5; .5 MG/3ML; MG/3ML
3 SOLUTION RESPIRATORY (INHALATION)
Qty: 120 VIAL | Refills: 2 | Status: SHIPPED
Start: 2020-07-28

## 2020-07-28 NOTE — PROGRESS NOTES
Thoracic Surgery Progress Note     Ana Wen is a 77year old male. MRN [id number]. Admitted 7/27/2020    [address] EVENTS:     No acute events overnight. Ambulating, using IS. Feeling well overall. Wants to go home.       Objective: Assessment/Plan:     77year old male s/p R VATs upper lobectomy POD. No air leak. Taylor lopez this AM, has not urinated yet.     Ambulate 3-4 times per day in the halls  Spend majority of day out of bed, in chair  Encouraged cough and IS use  Chest tu

## 2020-07-28 NOTE — PROGRESS NOTES
EMILY HOSPITALIST  Progress Note     Roxana Flood Patient Status:  Inpatient    1945 MRN [id number]   [facility] 6NE-A Attending Johny Dc., Moshe Mccormack MD   Hosp Day # 1 PCP Ludivina Zapata MD     Chief Complaint: lung CA    S: Sameer Motts 168 hours. Creatinine Kinase  No results for input(s): CK in the last 168 hours. Inflammatory Markers  No results for input(s): CRP, STEVE, LDH, DDIMER in the last 168 hours. Imaging: Imaging data reviewed in Epic.     Medications:   • Fluticasone Fu

## 2020-07-28 NOTE — OPERATIVE REPORT
[facility]    PATIENT'S NAME: Tony Ramirez   ATTENDING PHYSICIAN: [address] Jo Ann Cole MD   OPERATING PHYSICIAN: Kayla Cole MD   PATIENT ACCOUNT#:   [id number]    LOCATION:  NE-A [address] [place] [address]  MEDICAL RECORD #:   [id number]       DA Camera through this incision revealed I was safely in the chest space. I then made 3 additional incisions which were my standard incisions for right upper lobectomy.   I placed an Gildardo wound retractor at the superior-most incision for removal of specimen I used that same stapler to begin completing the fissure. I first completed the minor fissure by lining up the anvil of the stapler at the confluence of the upper and middle lobe veins.   A couple of stapler fires gave me greater mobility of the upper lobe camera and all instruments and closed remaining incisions in 3 layers with 2 layers of 2-0 Vicryl and 1 of 4-0 Monocryl. Patient tolerated the procedure well. I was present for the entirety of the procedure.       SPECIMENS:  Level 2, 4, 7, 10 and 11 lymp

## 2020-07-28 NOTE — PLAN OF CARE
Assumed care of pt at 0730. He is alert/oriented x 4. Received Oxycodone on prior shift with good result. Also receiving scheduled ofirmev and toradol. Assisted to chair and ambulated in the eng. Tolerated well. Awaiting Sx Team to determine plan of care. for opioid side effects  - Notify MD/LIP if interventions unsuccessful or patient reports new pain  - Anticipate increased pain with activity and pre-medicate as appropriate  Outcome: Progressing

## 2020-07-28 NOTE — PLAN OF CARE
Assumed care of pt at [address] [facility]. Neuros intact, 2L NC, VATS chest tube to water seal, clean, dry, intact, 245 out for shift. Vazquez in-place, taken out at 0430, DTV at 1030. A line intact, positional, removed in AM with no complications.  3 incision sites are gil

## 2020-07-28 NOTE — CM/SW NOTE
CM informed that pt will need neb machine for home. Lynne TREJO will page MD for order. Referral has been started on Aidin but will need MD order for insurance coverage. Plan: Will need MD order for neb machine. 13:00  Orders received.  Ronald accept

## 2020-07-28 NOTE — PROGRESS NOTES
[facility]  Progress Note    Ayan Quinones Patient Status:  Inpatient    1945 MRN [id number]   [facility] 6NE-A Attending Devan Farley, Nba Moralez MD   Hosp Day # 1 PCP Estela Jim MD     Subjective:  Ayan Quinones is a 13.1 13.0 13.0   NEPRELIM 5.09  --  11.41*   WBC 9.4 20.5* 13.6*   .0 207.0 189.0     Recent Labs   Lab 07/24/20  0753 07/27/20  1421 07/28/20  0432   * 169* 164*   BUN 16 16 19*   CREATSERUM 1.16 1.01 1.21   GFRAA 71 84 68   GFRNAA 62 73 59*

## 2020-07-29 ENCOUNTER — TELEPHONE (OUTPATIENT)
Dept: INTERNAL MEDICINE CLINIC | Facility: CLINIC | Age: 75
End: 2020-07-29

## 2020-07-29 ENCOUNTER — PATIENT OUTREACH (OUTPATIENT)
Dept: CASE MANAGEMENT | Age: 75
End: 2020-07-29

## 2020-07-29 DIAGNOSIS — C34.11 MALIGNANT NEOPLASM OF UPPER LOBE OF RIGHT LUNG (HCC): ICD-10-CM

## 2020-07-29 DIAGNOSIS — F17.200 TOBACCO USE DISORDER: ICD-10-CM

## 2020-07-29 DIAGNOSIS — C34.91 BRONCHOGENIC CANCER OF RIGHT LUNG (HCC): ICD-10-CM

## 2020-07-29 DIAGNOSIS — Z02.9 ENCOUNTERS FOR UNSPECIFIED ADMINISTRATIVE PURPOSE: ICD-10-CM

## 2020-07-29 DIAGNOSIS — C34.91 NON-SMALL CELL CANCER OF RIGHT LUNG (HCC): ICD-10-CM

## 2020-07-29 LAB — BLOOD TYPE BARCODE: 5100

## 2020-07-29 PROCEDURE — 1111F DSCHRG MED/CURRENT MED MERGE: CPT

## 2020-07-29 NOTE — TELEPHONE ENCOUNTER
Patient discharged home from [facility] on 7/28/2020 and is at moderate risk for readmission and recommended to have a TCM HFU on 8/11/2020 or sooner.   NCM attempted to schedule TCM HFU, patient states he would like to follow up with Dr. Sarath Pichardo

## 2020-07-29 NOTE — PROGRESS NOTES
Initial Post Discharge Follow Up   Discharge Date: 7/28/20  Contact Date: 7/29/2020    Consent Verification:  Assessment Completed With: Patient  HIPAA Verified? Yes    Discharge Dx:     Malignant neoplasm of upper lobe of right lung,  S/P Flexible bron worst pain you have ever experienced: 6/10, patient reports after the pain medication kicks in the pain is zero.  However, when as the pain medication wears off the pain starts to go back up,   o Alleviating factors: oxyCODONE  o Aggravating factors: no  o changes discussed with you prior to leaving the hospital? yes  • (NCM) If a new medication was prescribed:    o Was the new medication’s purpose & side effects reviewed? yes  o Do you have any questions about your new medication?  No  • Did you  your Time Appointment Department [facility])    Aug 03, 2020 10:30 AM CDT XR CHEST PA + LAT with [address] [facility] [place] ([facility] [place])        Aug 12, 2020  3:00 PM CDT HEMATOLOGY ONCOLOGY FOLLOW UP with [address] [place] patients with TCC appointments:     NCM offered sooner TCC appointment if schedule allowed:  NA    []  Advised patient to bring all medications and blood glucose meter/supplies if applicable.

## 2020-07-30 NOTE — TELEPHONE ENCOUNTER
We can schedule him for 8/13 or 8/14 for hospital follow up visit. I will cover the TCM visit components but we will bill it as a regular office visit/non-TCM visit.

## 2020-07-30 NOTE — TELEPHONE ENCOUNTER
Pt agreeable to come in for HFU on days provided. Appt scheduled.   Future Appointments   Date Time Provider [facility] Danay                 8/13/2020  9:00 AM Svitlana Casper MD [facility] [place]

## 2020-07-31 NOTE — DISCHARGE SUMMARY
[facility]    Discharge Summary    Di Zamorano Patient Status:  Inpatient    1945 MRN [id number]   [facility] 6NE-A Attending No att. providers found   [facility] Day # 1 PCP Anthony De Jesus MD     Date of Admission: 2020 Dis were pulled and the patient was sent home in good condition.     Consultations: Pulmonology, Internal Medicine    Procedures: Flexible bronchoscopy, right video assisted upper lobectomy, lymph node dissection    Complications: none    Discharge Condition: G

## 2020-08-03 ENCOUNTER — HOSPITAL ENCOUNTER (OUTPATIENT)
Dept: GENERAL RADIOLOGY | Age: 75
Discharge: HOME | End: 2020-08-03
Attending: INTERNAL MEDICINE
Payer: MEDICARE

## 2020-08-03 DIAGNOSIS — J44.9 CHRONIC OBSTRUCTIVE PULMONARY DISEASE, UNSPECIFIED COPD TYPE (HCC): Chronic | ICD-10-CM

## 2020-08-03 PROCEDURE — 71046 X-RAY EXAM CHEST 2 VIEWS: CPT | Performed by: INTERNAL MEDICINE

## 2020-08-12 ENCOUNTER — OFFICE VISIT (OUTPATIENT)
Dept: HEMATOLOGY/ONCOLOGY | Facility: HOSPITAL | Age: 75
End: 2020-08-12
Attending: INTERNAL MEDICINE
Payer: MEDICARE

## 2020-08-12 ENCOUNTER — PATIENT OUTREACH (OUTPATIENT)
Dept: CASE MANAGEMENT | Age: 75
End: 2020-08-12

## 2020-08-12 VITALS
DIASTOLIC BLOOD PRESSURE: 87 MMHG | HEART RATE: 96 BPM | RESPIRATION RATE: 18 BRPM | HEIGHT: 70.87 IN | WEIGHT: 205.19 LBS | SYSTOLIC BLOOD PRESSURE: 158 MMHG | BODY MASS INDEX: 28.73 KG/M2 | TEMPERATURE: 97 F | OXYGEN SATURATION: 96 %

## 2020-08-12 DIAGNOSIS — R91.8 MULTIPLE PULMONARY NODULES DETERMINED BY COMPUTED TOMOGRAPHY OF LUNG: ICD-10-CM

## 2020-08-12 DIAGNOSIS — C34.11 MALIGNANT NEOPLASM OF UPPER LOBE OF RIGHT LUNG (HCC): Primary | ICD-10-CM

## 2020-08-12 PROCEDURE — 99215 OFFICE O/P EST HI 40 MIN: CPT | Performed by: INTERNAL MEDICINE

## 2020-08-12 PROCEDURE — 99211 OFF/OP EST MAY X REQ PHY/QHP: CPT

## 2020-08-12 NOTE — PROGRESS NOTES
Mr. Nicanor Penny is a 77year old male who presents today in follow up after a VATS right upper lobectomy. He Is doing well. He complains of nothing at this time. He denies fevers or chills. No cough. No hemoptysis. No shortness of breath.  No worsening pain rhythm. No murmurs, rubs or gallops. No lower extremity edema. Lungs: Normal respiratory effort. Clear to ascultation bilaterally. Chest: incisions are clean, dry and intact. No signs of infection. Chest tube stitch removed.   Abdomen: Soft, Non-tender, n up again as needed    Yaquelin Chávez MD  Thoracic Surgery  Pager: [phone]

## 2020-08-12 NOTE — PROGRESS NOTES
[facility] Progress Note    Patient Name: James Edu   YOB: 1945   Medical Record Number: [id number]   CSN: [id number]   Attending Physician: Myriam Choi M.D.    Referring Physician: Barbara Sanchez MD      Date of Visit: 8/12/                                B) - Lymph node, Right lung level 11 lymph nodes                                                     C) - Lymph node, Right lung level 10 lymph nodes                                                     D) - Lymph node, Right balance. There is background atypical adenomatous hyperplasia.       The case is sent to [facility], who concur with the above diagnosis.     Synoptic Report   LUNG   Lung - All Specimens   8th Edition - Protocol posted: 2/26/2020   SPECIMEN   Procedure and 1.0 cm in length x 1.5 and 2.7 cm in diameter. The pleura is tan-pink to gray-purple, smooth to ragged, glistening, mildly congested, and is mottled with black streaks.   There is a 1.8 x 1.5 x 1.1 cm, ill-defined, gray-tan to gray-brown indurated mass lung level 4 lymph nodes, received in formalin: the specimen consists of five anthracotic lymph node candidates which range from 0.5 up to 0.8 cm. The largest candidate is inked blue and is bisected.   The specimen is submitted entirely in cassette D.    brain/breast         Psychosocial History:  Social History    Socioeconomic History      Marital status:       Spouse name: Not on file      Number of children: 1      Years of education: Not on file      Highest education level: Not on file    Occu Yes          2 cups coffee daily.          Occupational Exposure: Not Asked        Hobby Hazards: Not Asked        Sleep Concern: Not Asked        Stress Concern: Not Asked        Weight Concern: Not Asked        Special Diet: Not Asked        Back Care: No is no palpable lymphadenopathy throughout in the cervical, supraclavicular, axillary, or inguinal regions. Psych/Depression: Mood and affect are appropriate.     Laboratory:  Lab Component   Component Value Date/Time    RED BLOOD COUNT 5.11 12/19/2012 0731 07/28/2020 0432    Creatinine 1.01 07/27/2020 1421    Creatinine 1.16 07/24/2020 0753    GFR  109 08/26/2013 0836    GFR  78 12/19/2012 0731    GFR, -American 68 07/28/2020 0432    GFR, -American 84 07/27/2020 08/26/2013 0836    ALT 39 12/19/2012 0731    BILIRUBIN, TOTAL 0.6 08/26/2013 0836    BILIRUBIN, TOTAL 0.5 12/19/2012 0731    Bilirubin, Total 0.4 07/28/2020 0432    Bilirubin, Total 1.2 08/23/2019 1942    Bilirubin, Total 0.8 07/24/2019 0937    Total Prote which underwent CT-guided biopsy does not show elevated FDG activity, but there is mild FDG uptake SUV maximum 1.50 related to a subtle right lower lobe nodule.   The low level activity, and stability since August 2019 suggest that this is not likely a ma PROCEDURE:  CT LUNG LD SCREENING(CPT=)     COMPARISON:  [facility] , CT, CT LUNG LD SCREENING(CPT=), 5/11/2016, 6:13 PM.  [facility] , CT, CT LUNG LD SCREENING(CPT=), 8/09/2019, 8:58 AM.     INDICATIONS:   71-year-old male 50-pack year current s Location                    Slice 98 (HTF)                  Status                      Follow-up to 8/09/19                        Type                        Solid                           Spiculated                  No                              Lo Segment/Lobe            Left lower lobe                                    Location                    Slice 255 (HTF)                 Status                      follow-up                        Type                        Solid nodules with other suspicious features    S  Other potentially significant findings  Follow-up based on abnormality     LungRAD scores of 3, 4A, and 4B will be reviewed in the Multidisciplinary Thoracic Oncology Clinic           Dictated by: Flory Maldonado, ----------------------------------------------------------  ID   Finding                     Aug 09, 2019               ==========================================================  4    Comment                                                     Segment/L 4.4 mm                          Average Diameter            5 mm                            Lung-RADS Category          2                          ----------------------------------------------------------  3    Comment (3 months)    4b  Suspicious  Multidisciplinary Conference Review    4x  Suspicious  Category 3 or 4 nodules with other suspicious features    S  Other potentially significant findings  Follow-up based on abnormality     LungRAD scores of 3, 4A, and 4B sydni

## 2020-08-12 NOTE — PROGRESS NOTES
Unable to receive calls. Attempted contacting pt to intro CCM services with no answer. Will continue contacting to discuss.

## 2020-08-13 ENCOUNTER — OFFICE VISIT (OUTPATIENT)
Dept: INTERNAL MEDICINE CLINIC | Facility: CLINIC | Age: 75
End: 2020-08-13
Payer: MEDICARE

## 2020-08-13 VITALS
RESPIRATION RATE: 20 BRPM | SYSTOLIC BLOOD PRESSURE: 126 MMHG | TEMPERATURE: 98 F | HEIGHT: 71.5 IN | OXYGEN SATURATION: 96 % | BODY MASS INDEX: 27.52 KG/M2 | HEART RATE: 80 BPM | WEIGHT: 201 LBS | DIASTOLIC BLOOD PRESSURE: 70 MMHG

## 2020-08-13 DIAGNOSIS — J44.9 CHRONIC OBSTRUCTIVE PULMONARY DISEASE, UNSPECIFIED COPD TYPE (HCC): Chronic | ICD-10-CM

## 2020-08-13 DIAGNOSIS — I10 BENIGN ESSENTIAL HTN: Chronic | ICD-10-CM

## 2020-08-13 DIAGNOSIS — R91.8 MULTIPLE PULMONARY NODULES DETERMINED BY COMPUTED TOMOGRAPHY OF LUNG: Chronic | ICD-10-CM

## 2020-08-13 DIAGNOSIS — R73.03 PREDIABETES: ICD-10-CM

## 2020-08-13 DIAGNOSIS — C34.91 BRONCHOGENIC CANCER OF RIGHT LUNG (HCC): ICD-10-CM

## 2020-08-13 DIAGNOSIS — C34.91 NON-SMALL CELL CANCER OF RIGHT LUNG (HCC): Primary | ICD-10-CM

## 2020-08-13 PROCEDURE — 1111F DSCHRG MED/CURRENT MED MERGE: CPT | Performed by: INTERNAL MEDICINE

## 2020-08-13 PROCEDURE — 99214 OFFICE O/P EST MOD 30 MIN: CPT | Performed by: INTERNAL MEDICINE

## 2020-08-13 NOTE — PATIENT INSTRUCTIONS
- Follow up with Dr. Lori Tellez as scheduled  - Follow up with us in the spring (6-8 months) for chronic issues, earlier as needed for acute issues. - Repeat blood pressure reading looks great (126/70).   - Continue with inhaler, nebulizer therapy  - Your hem

## 2020-08-13 NOTE — PROGRESS NOTES
Esaw Grandchild is a 77year old male. HPI:   Patient presents with:  Hospital F/U    Patient presents for follow up from recent hospitalization, however he is just outside the 14 day TCM window.   He was hospitalized for VATS/lobectomy for treatment Budesonide-Formoterol Fumarate (SYMBICORT) 160-4.5 MCG/ACT Inhalation Aerosol, Inhale 2 puffs into the lungs 2 (two) times daily. , Disp: 3 Inhaler, Rfl: 1  •  Cholecalciferol (VITAMIN D3) 1000 UNITS Oral Cap, Take 1 tablet by mouth daily.   , Disp: , Rfl: bilaterally, no wheezing/rubs  CARDIO: RRR without murmurs. No clubbing, cyanosis or edema.   GI: soft non tender nondistended no hepatosplenomegaly, bowel sounds throughout  PSYCH: pleasant, appropriate mood and affect  ASSESSMENT AND PLAN:   1. Non-small

## 2020-08-16 DIAGNOSIS — J44.9 COPD, SEVERE (HCC): Chronic | ICD-10-CM

## 2020-08-17 RX ORDER — BUDESONIDE AND FORMOTEROL FUMARATE DIHYDRATE 160; 4.5 UG/1; UG/1
2 AEROSOL RESPIRATORY (INHALATION) 2 TIMES DAILY
Qty: 3 INHALER | Refills: 1 | Status: SHIPPED | OUTPATIENT
Start: 2020-08-17 | End: 2021-01-21

## 2020-08-17 NOTE — TELEPHONE ENCOUNTER
Failed protocol     Last refill:  5/20/2020 Incruse #30 3R  1/15/2020 Symbicort #3 Inh 1R    LOV:   8/13/2020 Dr Cynthia Nicole RTC 6 months  4. Chronic obstructive pulmonary disease, unspecified COPD type ([facility] [address]  On Symbicort, Incruse Ellipta, neb treatments.   Fo

## 2020-08-19 ENCOUNTER — APPOINTMENT (OUTPATIENT)
Dept: HEMATOLOGY/ONCOLOGY | Facility: HOSPITAL | Age: 75
End: 2020-08-19
Attending: INTERNAL MEDICINE
Payer: MEDICARE

## 2020-09-22 ENCOUNTER — PATIENT OUTREACH (OUTPATIENT)
Dept: CASE MANAGEMENT | Age: 75
End: 2020-09-22

## 2020-10-12 ENCOUNTER — TELEPHONE (OUTPATIENT)
Dept: INTERNAL MEDICINE CLINIC | Facility: CLINIC | Age: 75
End: 2020-10-12

## 2020-10-12 RX ORDER — ALBUTEROL SULFATE 90 UG/1
1 AEROSOL, METERED RESPIRATORY (INHALATION) EVERY 6 HOURS PRN
Qty: 18 G | Refills: 1 | Status: SHIPPED | OUTPATIENT
Start: 2020-10-12 | End: 2020-10-13

## 2020-10-12 NOTE — TELEPHONE ENCOUNTER
Pt informed. Waiting on a return call from Dr Radha Barillas office. Wife asking if pt can try otc Claritin in case cough is related to allergies.

## 2020-10-12 NOTE — TELEPHONE ENCOUNTER
Albuterol sent to mail order, recommend he let Dr. Obdulia Monterroso office know about his current symptoms, they may want to evaluate him.

## 2020-10-12 NOTE — TELEPHONE ENCOUNTER
Pt c/o having episodes of heavy coughing, clear phlegm, increased nasal drainage and hard time breathing on and off since last week. Coughing episodes usually last about 10 min. Pt also c/o fatigue and wheezing going up and down stairs.  Average 02 sat 91%

## 2020-10-12 NOTE — TELEPHONE ENCOUNTER
Ok to try any of the following: Claritin (loratadine), Zyrtec (cetirizine), or Allegra (fexofenadine). Once daily. He can also start over the counter steroid nasal sprays such as fluticasone, mometasone, Flonase, Nasocort, Nasonex, Rhinocort.   Twice rosemary

## 2020-10-12 NOTE — TELEPHONE ENCOUNTER
Patient has ongoing lung issues; see chart notes; Patient walked into office thought he had made an appointment on mychart; no available appointments wt pcp; offered another provider pt only wants to see RP/he knows is history.   He woke up yesterday and ha

## 2020-10-13 ENCOUNTER — TELEPHONE (OUTPATIENT)
Dept: INTERNAL MEDICINE CLINIC | Facility: CLINIC | Age: 75
End: 2020-10-13

## 2020-10-13 RX ORDER — ALBUTEROL SULFATE 90 UG/1
2 AEROSOL, METERED RESPIRATORY (INHALATION) EVERY 6 HOURS PRN
Qty: 18 G | Refills: 2 | Status: SHIPPED | OUTPATIENT
Start: 2020-10-13 | End: 2020-12-10

## 2020-10-13 NOTE — TELEPHONE ENCOUNTER
Received fax from [facility] stating Albuterol Inhaler HFA is a non-formulary drug on patient's prescription plan. Please consider VENTOLIN XOPENEX as an alternate or obtain a prior authorization at #[phone].   Formulary alternates can be f

## 2020-10-14 ENCOUNTER — OFFICE VISIT (OUTPATIENT)
Dept: INTERNAL MEDICINE CLINIC | Facility: CLINIC | Age: 75
End: 2020-10-14
Payer: MEDICARE

## 2020-10-14 ENCOUNTER — HOSPITAL ENCOUNTER (OUTPATIENT)
Dept: GENERAL RADIOLOGY | Age: 75
Discharge: HOME | End: 2020-10-14
Attending: INTERNAL MEDICINE
Payer: MEDICARE

## 2020-10-14 VITALS
SYSTOLIC BLOOD PRESSURE: 142 MMHG | RESPIRATION RATE: 20 BRPM | HEIGHT: 71.5 IN | BODY MASS INDEX: 27.71 KG/M2 | WEIGHT: 202.38 LBS | TEMPERATURE: 98 F | OXYGEN SATURATION: 96 % | HEART RATE: 84 BPM | DIASTOLIC BLOOD PRESSURE: 80 MMHG

## 2020-10-14 DIAGNOSIS — J44.9 CHRONIC OBSTRUCTIVE PULMONARY DISEASE, UNSPECIFIED COPD TYPE (HCC): Chronic | ICD-10-CM

## 2020-10-14 DIAGNOSIS — R06.00 DYSPNEA ON EXERTION: ICD-10-CM

## 2020-10-14 DIAGNOSIS — C34.91 NON-SMALL CELL CANCER OF RIGHT LUNG (HCC): ICD-10-CM

## 2020-10-14 DIAGNOSIS — I10 BENIGN ESSENTIAL HTN: Chronic | ICD-10-CM

## 2020-10-14 DIAGNOSIS — R09.81 NASAL CONGESTION: Primary | ICD-10-CM

## 2020-10-14 PROCEDURE — 71046 X-RAY EXAM CHEST 2 VIEWS: CPT | Performed by: INTERNAL MEDICINE

## 2020-10-14 PROCEDURE — 99214 OFFICE O/P EST MOD 30 MIN: CPT | Performed by: INTERNAL MEDICINE

## 2020-10-14 RX ORDER — METHYLPREDNISOLONE 4 MG/1
TABLET ORAL
Qty: 1 KIT | Refills: 0 | Status: SHIPPED | OUTPATIENT
Start: 2020-10-14 | End: 2020-12-11 | Stop reason: ALTCHOICE

## 2020-10-14 RX ORDER — AZELASTINE 1 MG/ML
1 SPRAY, METERED NASAL 2 TIMES DAILY
Qty: 30 ML | Refills: 0 | Status: SHIPPED | OUTPATIENT
Start: 2020-10-14

## 2020-10-14 NOTE — PATIENT INSTRUCTIONS
- Continue Claritin every day. - Start prescription nasal spray (azelastine). Use 1 puff twice daily.  - Schedule chest x-ray.   You can check if they have openings right now at the immediate care, otherwise you can schedule an appointment online through

## 2020-10-14 NOTE — TELEPHONE ENCOUNTER
Pt walked in and states there is an issues with his inhaler. I contacted pharmacy and they state rx for brand name Ventolin is in process and ready to be shipped. Ref # [id number]    Informed pt.

## 2020-10-14 NOTE — PROGRESS NOTES
Roxana Flood is a 77year old male. HPI:   Patient presents with:  Shortness Of Breath: Pt c/o o2 levels dropping at night  - lowest 83%  Cough: with clear phlegm. Pt states his symptoms began on 10/6/2020 (right after he received a flu shot). Aero Soln, Inhale 2 puffs into the lungs every 6 (six) hours as needed for Wheezing., Disp: 18 g, Rfl: 2  •  Budesonide-Formoterol Fumarate (SYMBICORT) 160-4.5 MCG/ACT Inhalation Aerosol, Inhale 2 puffs into the lungs 2 (two) times daily. , Disp: 3 Inhaler, (93.1 kg)  07/28/20 : 199 lb 11.8 oz (90.6 kg)  07/15/20 : 200 lb (90.7 kg)  07/01/20 : 197 lb (89.4 kg)    EXAM:   /80 (BP Location: Left arm, Patient Position: Sitting, Cuff Size: adult)   Pulse 84   Temp 97.9 °F (36.6 °C) (Oral)   Resp 20   Ht 71. Monitoring for now. CT chest to be done in January. 5. Benign essential HTN  Borderline systolic blood pressure today. Monitor for now.     Patient Care Team:  Magdiel Arora MD as PCP - General (Internal Medicine)  Magdiel Arora MD as PCP - [facility]

## 2020-12-09 ENCOUNTER — TELEPHONE (OUTPATIENT)
Dept: INTERNAL MEDICINE CLINIC | Facility: CLINIC | Age: 75
End: 2020-12-09

## 2020-12-09 DIAGNOSIS — Z90.2 S/P PARTIAL LOBECTOMY OF LUNG: Primary | ICD-10-CM

## 2020-12-09 DIAGNOSIS — J44.9 CHRONIC OBSTRUCTIVE PULMONARY DISEASE, UNSPECIFIED COPD TYPE (HCC): Chronic | ICD-10-CM

## 2020-12-09 DIAGNOSIS — C34.91 BRONCHOGENIC CANCER OF RIGHT LUNG (HCC): ICD-10-CM

## 2020-12-09 NOTE — TELEPHONE ENCOUNTER
Pt would like a call back from nurse says he spoke with pulmonologist and he ran some oxygen test and said he needed oxygen at night and pt has question about it

## 2020-12-09 NOTE — TELEPHONE ENCOUNTER
Pt called to request order for o2 tank for night time    Per pt was advised to get o2 tank, as he needs o2 2L x min via nasal canula at night.  Per pt recommendation by Dr Franck Elise. 2 weeks ago, as pt completed test that demonstrated o2 levels dropping

## 2020-12-09 NOTE — TELEPHONE ENCOUNTER
Generally I would recommend the pulmonologist's office handle the oxygen orders/supplies; additionally we would need Dr. Jose Mendez progress notes first (they are not on Epic) before any DME order could be entered.

## 2020-12-10 DIAGNOSIS — J44.9 COPD, SEVERE (HCC): Chronic | ICD-10-CM

## 2020-12-11 RX ORDER — ALBUTEROL SULFATE 90 UG/1
2 AEROSOL, METERED RESPIRATORY (INHALATION) EVERY 6 HOURS PRN
Qty: 18 G | Refills: 1 | Status: SHIPPED | OUTPATIENT
Start: 2020-12-11 | End: 2021-12-17

## 2020-12-11 RX ORDER — UMECLIDINIUM 62.5 UG/1
1 AEROSOL, POWDER ORAL DAILY
Qty: 90 EACH | Refills: 1 | Status: SHIPPED | OUTPATIENT
Start: 2020-12-11 | End: 2021-03-08

## 2020-12-11 NOTE — TELEPHONE ENCOUNTER
Failed protocol     Last refill:  10/13/2020 Ventolin HFA 18G 2R  8/17/2020 Incruse Ellipta 30 each 3R    LOV:   10/14/2020 Dr Sunshine Dawn RTC 3-6 months  3.  Chronic obstructive pulmonary disease, unspecified COPD type ([address]  Some wheezing on exam.  He has chr

## 2021-02-01 DIAGNOSIS — Z23 NEED FOR VACCINATION: ICD-10-CM

## 2021-02-08 ENCOUNTER — HOSPITAL ENCOUNTER (OUTPATIENT)
Dept: CT IMAGING | Facility: HOSPITAL | Age: 76
Discharge: HOME | End: 2021-02-08
Attending: INTERNAL MEDICINE
Payer: MEDICARE

## 2021-02-08 DIAGNOSIS — C34.11 MALIGNANT NEOPLASM OF UPPER LOBE OF RIGHT LUNG (HCC): ICD-10-CM

## 2021-02-08 DIAGNOSIS — C34.11 MALIGNANT NEOPLASM OF UPPER LOBE OF RIGHT LUNG (HCC): Primary | ICD-10-CM

## 2021-02-08 DIAGNOSIS — R91.8 MULTIPLE PULMONARY NODULES DETERMINED BY COMPUTED TOMOGRAPHY OF LUNG: ICD-10-CM

## 2021-02-08 LAB — CREAT BLD-MCNC: 1 MG/DL

## 2021-02-08 PROCEDURE — 71260 CT THORAX DX C+: CPT | Performed by: INTERNAL MEDICINE

## 2021-02-08 PROCEDURE — 82565 ASSAY OF CREATININE: CPT

## 2021-02-09 ENCOUNTER — IMMUNIZATION (OUTPATIENT)
Dept: LAB | Age: 76
End: 2021-02-09
Attending: HOSPITALIST
Payer: MEDICARE

## 2021-02-09 DIAGNOSIS — Z23 NEED FOR VACCINATION: Primary | ICD-10-CM

## 2021-02-09 PROCEDURE — 0001A SARSCOV2 VAC 30MCG/0.3ML IM: CPT

## 2021-02-10 ENCOUNTER — OFFICE VISIT (OUTPATIENT)
Dept: HEMATOLOGY/ONCOLOGY | Facility: HOSPITAL | Age: 76
End: 2021-02-10
Attending: INTERNAL MEDICINE
Payer: MEDICARE

## 2021-02-10 VITALS
WEIGHT: 206 LBS | RESPIRATION RATE: 18 BRPM | BODY MASS INDEX: 28 KG/M2 | DIASTOLIC BLOOD PRESSURE: 95 MMHG | SYSTOLIC BLOOD PRESSURE: 163 MMHG | TEMPERATURE: 97 F | OXYGEN SATURATION: 95 % | HEART RATE: 91 BPM

## 2021-02-10 DIAGNOSIS — C34.11 MALIGNANT NEOPLASM OF UPPER LOBE OF RIGHT LUNG (HCC): ICD-10-CM

## 2021-02-10 DIAGNOSIS — Z78.9 EX-SMOKER FOR LESS THAN 1 YEAR: ICD-10-CM

## 2021-02-10 DIAGNOSIS — J44.1 CHRONIC OBSTRUCTIVE PULMONARY DISEASE WITH ACUTE EXACERBATION (HCC): ICD-10-CM

## 2021-02-10 DIAGNOSIS — R91.8 MULTIPLE PULMONARY NODULES DETERMINED BY COMPUTED TOMOGRAPHY OF LUNG: Primary | ICD-10-CM

## 2021-02-10 LAB
ALBUMIN SERPL-MCNC: 3.8 G/DL (ref 3.4–5)
ALBUMIN/GLOB SERPL: 1.1 {RATIO} (ref 1–2)
ALP LIVER SERPL-CCNC: 100 U/L
ALT SERPL-CCNC: 32 U/L
ANION GAP SERPL CALC-SCNC: 6 MMOL/L (ref 0–18)
AST SERPL-CCNC: 14 U/L (ref 15–37)
BASOPHILS # BLD AUTO: 0.05 X10(3) UL (ref 0–0.2)
BASOPHILS NFR BLD AUTO: 0.5 %
BILIRUB SERPL-MCNC: 0.7 MG/DL (ref 0.1–2)
BUN BLD-MCNC: 11 MG/DL (ref 7–18)
BUN/CREAT SERPL: 8.7 (ref 10–20)
CALCIUM BLD-MCNC: 9.4 MG/DL (ref 8.5–10.1)
CHLORIDE SERPL-SCNC: 108 MMOL/L (ref 98–112)
CO2 SERPL-SCNC: 26 MMOL/L (ref 21–32)
CREAT BLD-MCNC: 1.26 MG/DL
DEPRECATED RDW RBC AUTO: 41.5 FL (ref 35.1–46.3)
EOSINOPHIL # BLD AUTO: 0.49 X10(3) UL (ref 0–0.7)
EOSINOPHIL NFR BLD AUTO: 5.1 %
ERYTHROCYTE [DISTWIDTH] IN BLOOD BY AUTOMATED COUNT: 12.8 % (ref 11–15)
GLOBULIN PLAS-MCNC: 3.5 G/DL (ref 2.8–4.4)
GLUCOSE BLD-MCNC: 114 MG/DL (ref 70–99)
HCT VFR BLD AUTO: 42.4 %
HGB BLD-MCNC: 14.6 G/DL
IMM GRANULOCYTES # BLD AUTO: 0.02 X10(3) UL (ref 0–1)
IMM GRANULOCYTES NFR BLD: 0.2 %
LDH SERPL L TO P-CCNC: 164 U/L
LYMPHOCYTES # BLD AUTO: 3.3 X10(3) UL (ref 1–4)
LYMPHOCYTES NFR BLD AUTO: 34.5 %
M PROTEIN MFR SERPL ELPH: 7.3 G/DL (ref 6.4–8.2)
MCH RBC QN AUTO: 30.7 PG (ref 26–34)
MCHC RBC AUTO-ENTMCNC: 34.4 G/DL (ref 31–37)
MCV RBC AUTO: 89.1 FL
MONOCYTES # BLD AUTO: 0.74 X10(3) UL (ref 0.1–1)
MONOCYTES NFR BLD AUTO: 7.7 %
NEUTROPHILS # BLD AUTO: 4.97 X10 (3) UL (ref 1.5–7.7)
NEUTROPHILS # BLD AUTO: 4.97 X10(3) UL (ref 1.5–7.7)
NEUTROPHILS NFR BLD AUTO: 52 %
OSMOLALITY SERPL CALC.SUM OF ELEC: 290 MOSM/KG (ref 275–295)
PATIENT FASTING Y/N/NP: NO
PLATELET # BLD AUTO: 268 10(3)UL (ref 150–450)
POTASSIUM SERPL-SCNC: 4 MMOL/L (ref 3.5–5.1)
RBC # BLD AUTO: 4.76 X10(6)UL
SODIUM SERPL-SCNC: 140 MMOL/L (ref 136–145)
WBC # BLD AUTO: 9.6 X10(3) UL (ref 4–11)

## 2021-02-10 PROCEDURE — 99215 OFFICE O/P EST HI 40 MIN: CPT | Performed by: INTERNAL MEDICINE

## 2021-02-10 NOTE — PROGRESS NOTES
[employer] Progress Note    Patient Name: Blossom Crawford   YOB: 1945   Medical Record Number: [id number]   CSN: [id number]   Attending Physician: Aubrey Paulino M.D.    Referring Physician: Alvaro David MD      Date of Visit: 2/10/ Performed by Florrie Nageotte, MD at [address] [place] [address]   • INGUINAL HERNIA REPAIR     • THORACOSCOPY/VATS Right 7/27/2020    Performed by Natalia Knight., Darren Jalloh MD at [place] [address] History:  Family History   Problem Relation Age of Onse Attends meetings of clubs or organizations: Not on file        Relationship status: Not on file      Intimate partner violence        Fear of current or ex partner: Not on file        Emotionally abused: Not on file        Physically abused: Not on file Cap, Take 1 tablet by mouth daily.   , Disp: , Rfl:     Review of Systems:  A 14-point ROS was done with pertinent positives and negative per the HPI    Vital Signs:  BP (!) 163/95 (BP Location: Left arm, Patient Position: Sitting, Cuff Size: large)   Pulse 164 87 - 241 U/L   CBC W/ DIFFERENTIAL    Collection Time: 02/10/21  8:52 AM   Result Value Ref Range    WBC 9.6 4.0 - 11.0 x10(3) uL    RBC 4.76 3.80 - 5.80 x10(6)uL    HGB 14.6 13.0 - 17.5 g/dL    HCT 42.4 39.0 - 53.0 %    .0 150.0 - 450.0 10(3)uL the right lower lobe medially. Also, adjacent to the left costophrenic angle is a pleural   based left lower lobe 12 x 11 mm nodule and posteriorly adjacent to the hemidiaphragm is a 1.3 cm nodule not appreciated on prior study.   Adjacent to the left max hours. F-18 FDG was injected IV, and whole-body images from vertex to mid-thigh were obtained with concurrent CT scan for both anatomic localization as well as attenuation correction.       RADIOPHARMACEUTICAL:    14.0 mCi F-18 FDG  FASTING GLUCOSE LEVEL: see (1). Previously seen nodules stable    3. COPD- recent flare and now doing better. Continue current inhalers.       PET ordered  Labs reviewed  Will arrange f/u pending PET    Ping Orta MD  [facility]

## 2021-02-12 ENCOUNTER — HOSPITAL ENCOUNTER (OUTPATIENT)
Dept: NUCLEAR MEDICINE | Facility: HOSPITAL | Age: 76
Discharge: HOME | End: 2021-02-12
Attending: INTERNAL MEDICINE
Payer: MEDICARE

## 2021-02-12 DIAGNOSIS — C34.11 MALIGNANT NEOPLASM OF UPPER LOBE OF RIGHT LUNG (HCC): ICD-10-CM

## 2021-02-12 DIAGNOSIS — R91.8 MULTIPLE PULMONARY NODULES DETERMINED BY COMPUTED TOMOGRAPHY OF LUNG: ICD-10-CM

## 2021-02-12 LAB — GLUCOSE BLD-MCNC: 106 MG/DL (ref 70–99)

## 2021-02-12 PROCEDURE — 78815 PET IMAGE W/CT SKULL-THIGH: CPT | Performed by: INTERNAL MEDICINE

## 2021-02-12 PROCEDURE — 82962 GLUCOSE BLOOD TEST: CPT

## 2021-02-17 ENCOUNTER — TELEPHONE (OUTPATIENT)
Dept: HEMATOLOGY/ONCOLOGY | Facility: HOSPITAL | Age: 76
End: 2021-02-17

## 2021-02-17 ENCOUNTER — TUMOR BOARD CONFERENCE (OUTPATIENT)
Dept: RADIATION ONCOLOGY | Facility: HOSPITAL | Age: 76
End: 2021-02-17

## 2021-02-17 DIAGNOSIS — R91.8 ABNORMAL CT SCAN OF LUNG: ICD-10-CM

## 2021-02-17 DIAGNOSIS — C34.11 MALIGNANT NEOPLASM OF UPPER LOBE OF RIGHT LUNG (HCC): Primary | ICD-10-CM

## 2021-02-17 RX ORDER — AMOXICILLIN AND CLAVULANATE POTASSIUM 875; 125 MG/1; MG/1
1 TABLET, FILM COATED ORAL 2 TIMES DAILY
Qty: 20 TABLET | Refills: 0 | Status: SHIPPED | OUTPATIENT
Start: 2021-02-17 | End: 2021-03-08

## 2021-02-17 NOTE — TELEPHONE ENCOUNTER
Spoke with patient regarding recommendations from lung conference. Patient has been on Azithromycin from Dr Carmen Lefort since December. He still has a cough.     Plan was to repeat CT in 3 months and if larger proceed with SBRT as this area is not amen

## 2021-02-17 NOTE — TELEPHONE ENCOUNTER
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

## 2021-03-02 ENCOUNTER — IMMUNIZATION (OUTPATIENT)
Dept: LAB | Age: 76
End: 2021-03-02
Attending: HOSPITALIST
Payer: MEDICARE

## 2021-03-02 DIAGNOSIS — Z23 NEED FOR VACCINATION: Primary | ICD-10-CM

## 2021-03-02 PROCEDURE — 0002A SARSCOV2 VAC 30MCG/0.3ML IM: CPT

## 2021-03-08 ENCOUNTER — APPOINTMENT (OUTPATIENT)
Dept: GENERAL RADIOLOGY | Facility: HOSPITAL | Age: 76
End: 2021-03-08
Attending: EMERGENCY MEDICINE
Payer: MEDICARE

## 2021-03-08 ENCOUNTER — HOSPITAL ENCOUNTER (OUTPATIENT)
Age: 76
Discharge: ANOTHER HOSPITAL | End: 2021-03-08
Attending: EMERGENCY MEDICINE
Payer: MEDICARE

## 2021-03-08 ENCOUNTER — HOSPITAL ENCOUNTER (OUTPATIENT)
Facility: HOSPITAL | Age: 76
Setting detail: OBSERVATION
LOS: 1 days | Discharge: HOME | End: 2021-03-09
Attending: HOSPITALIST | Admitting: HOSPITALIST
Payer: MEDICARE

## 2021-03-08 ENCOUNTER — APPOINTMENT (OUTPATIENT)
Dept: CT IMAGING | Facility: HOSPITAL | Age: 76
End: 2021-03-08
Attending: EMERGENCY MEDICINE
Payer: MEDICARE

## 2021-03-08 ENCOUNTER — TELEPHONE (OUTPATIENT)
Dept: INTERNAL MEDICINE CLINIC | Facility: CLINIC | Age: 76
End: 2021-03-08

## 2021-03-08 VITALS
OXYGEN SATURATION: 97 % | HEART RATE: 131 BPM | DIASTOLIC BLOOD PRESSURE: 117 MMHG | SYSTOLIC BLOOD PRESSURE: 260 MMHG | TEMPERATURE: 99 F | RESPIRATION RATE: 29 BRPM

## 2021-03-08 DIAGNOSIS — R06.03 RESPIRATORY DISTRESS: Primary | ICD-10-CM

## 2021-03-08 DIAGNOSIS — J44.1 COPD EXACERBATION (HCC): Primary | ICD-10-CM

## 2021-03-08 DIAGNOSIS — R03.0 ELEVATED BLOOD PRESSURE READING: ICD-10-CM

## 2021-03-08 DIAGNOSIS — R09.02 HYPOXEMIA: ICD-10-CM

## 2021-03-08 LAB
ADENOVIRUS PCR:: NEGATIVE
ALBUMIN SERPL-MCNC: 4.5 G/DL (ref 3.4–5)
ALBUMIN/GLOB SERPL: 1.2 {RATIO} (ref 1–2)
ALP LIVER SERPL-CCNC: 97 U/L
ALT SERPL-CCNC: 38 U/L
ANION GAP SERPL CALC-SCNC: 8 MMOL/L (ref 0–18)
AST SERPL-CCNC: 21 U/L (ref 15–37)
ATRIAL RATE: 106 BPM
B PERT DNA SPEC QL NAA+PROBE: NEGATIVE
BASOPHILS # BLD AUTO: 0.07 X10(3) UL (ref 0–0.2)
BASOPHILS NFR BLD AUTO: 0.6 %
BILIRUB SERPL-MCNC: 0.8 MG/DL (ref 0.1–2)
BILIRUB UR QL STRIP.AUTO: NEGATIVE
BUN BLD-MCNC: 12 MG/DL (ref 7–18)
BUN/CREAT SERPL: 11.7 (ref 10–20)
C PNEUM DNA SPEC QL NAA+PROBE: NEGATIVE
CALCIUM BLD-MCNC: 9.7 MG/DL (ref 8.5–10.1)
CHLORIDE SERPL-SCNC: 108 MMOL/L (ref 98–112)
CLARITY UR REFRACT.AUTO: CLEAR
CO2 SERPL-SCNC: 22 MMOL/L (ref 21–32)
CORONAVIRUS 229E PCR:: NEGATIVE
CORONAVIRUS HKU1 PCR:: NEGATIVE
CORONAVIRUS NL63 PCR:: NEGATIVE
CORONAVIRUS OC43 PCR:: NEGATIVE
CREAT BLD-MCNC: 1.03 MG/DL
D-DIMER: 2.1 UG/ML FEU (ref ?–0.74)
DEPRECATED RDW RBC AUTO: 42.9 FL (ref 35.1–46.3)
EOSINOPHIL # BLD AUTO: 0.17 X10(3) UL (ref 0–0.7)
EOSINOPHIL NFR BLD AUTO: 1.4 %
ERYTHROCYTE [DISTWIDTH] IN BLOOD BY AUTOMATED COUNT: 13.2 % (ref 11–15)
FLUAV RNA SPEC QL NAA+PROBE: NEGATIVE
FLUBV RNA SPEC QL NAA+PROBE: NEGATIVE
GLOBULIN PLAS-MCNC: 3.7 G/DL (ref 2.8–4.4)
GLUCOSE BLD-MCNC: 125 MG/DL (ref 70–99)
GLUCOSE UR STRIP.AUTO-MCNC: NEGATIVE MG/DL
HCT VFR BLD AUTO: 44 %
HGB BLD-MCNC: 15.4 G/DL
IMM GRANULOCYTES # BLD AUTO: 0.03 X10(3) UL (ref 0–1)
IMM GRANULOCYTES NFR BLD: 0.2 %
LEUKOCYTE ESTERASE UR QL STRIP.AUTO: NEGATIVE
LYMPHOCYTES # BLD AUTO: 3.19 X10(3) UL (ref 1–4)
LYMPHOCYTES NFR BLD AUTO: 26.1 %
M PROTEIN MFR SERPL ELPH: 8.2 G/DL (ref 6.4–8.2)
MCH RBC QN AUTO: 31 PG (ref 26–34)
MCHC RBC AUTO-ENTMCNC: 35 G/DL (ref 31–37)
MCV RBC AUTO: 88.5 FL
METAPNEUMOVIRUS PCR:: NEGATIVE
MONOCYTES # BLD AUTO: 0.91 X10(3) UL (ref 0.1–1)
MONOCYTES NFR BLD AUTO: 7.5 %
MYCOPLASMA PNEUMONIA PCR:: NEGATIVE
NEUTROPHILS # BLD AUTO: 7.83 X10 (3) UL (ref 1.5–7.7)
NEUTROPHILS # BLD AUTO: 7.83 X10(3) UL (ref 1.5–7.7)
NEUTROPHILS NFR BLD AUTO: 64.2 %
NITRITE UR QL STRIP.AUTO: NEGATIVE
OSMOLALITY SERPL CALC.SUM OF ELEC: 287 MOSM/KG (ref 275–295)
P AXIS: 61 DEGREES
P-R INTERVAL: 190 MS
PARAINFLUENZA 1 PCR:: NEGATIVE
PARAINFLUENZA 2 PCR:: NEGATIVE
PARAINFLUENZA 3 PCR:: NEGATIVE
PARAINFLUENZA 4 PCR:: NEGATIVE
PH UR STRIP.AUTO: 6 [PH] (ref 5–8)
PLATELET # BLD AUTO: 272 10(3)UL (ref 150–450)
POTASSIUM SERPL-SCNC: 4.1 MMOL/L (ref 3.5–5.1)
PROCALCITONIN SERPL-MCNC: <0.05 NG/ML (ref ?–0.16)
PROT UR STRIP.AUTO-MCNC: NEGATIVE MG/DL
Q-T INTERVAL: 332 MS
QRS DURATION: 80 MS
QTC CALCULATION (BEZET): 441 MS
R AXIS: 36 DEGREES
RBC # BLD AUTO: 4.97 X10(6)UL
RBC UR QL AUTO: NEGATIVE
RHINOVIRUS/ENTERO PCR:: NEGATIVE
RSV RNA SPEC QL NAA+PROBE: NEGATIVE
SARS-COV-2 RNA RESP QL NAA+PROBE: NOT DETECTED
SODIUM SERPL-SCNC: 138 MMOL/L (ref 136–145)
SP GR UR STRIP.AUTO: 1.01 (ref 1–1.03)
T AXIS: 55 DEGREES
TROPONIN I SERPL-MCNC: <0.045 NG/ML (ref ?–0.04)
UROBILINOGEN UR STRIP.AUTO-MCNC: <2 MG/DL
VENTRICULAR RATE: 106 BPM
WBC # BLD AUTO: 12.2 X10(3) UL (ref 4–11)

## 2021-03-08 PROCEDURE — 99220 INITIAL OBSERVATION CARE,LEVL III: CPT | Performed by: INTERNAL MEDICINE

## 2021-03-08 PROCEDURE — 96372 THER/PROPH/DIAG INJ SC/IM: CPT

## 2021-03-08 PROCEDURE — 99214 OFFICE O/P EST MOD 30 MIN: CPT

## 2021-03-08 PROCEDURE — 71045 X-RAY EXAM CHEST 1 VIEW: CPT | Performed by: EMERGENCY MEDICINE

## 2021-03-08 PROCEDURE — 71260 CT THORAX DX C+: CPT | Performed by: EMERGENCY MEDICINE

## 2021-03-08 RX ORDER — IPRATROPIUM BROMIDE AND ALBUTEROL SULFATE 2.5; .5 MG/3ML; MG/3ML
3 SOLUTION RESPIRATORY (INHALATION) EVERY 4 HOURS PRN
Status: DISCONTINUED | OUTPATIENT
Start: 2021-03-08 | End: 2021-03-09

## 2021-03-08 RX ORDER — POLYETHYLENE GLYCOL 3350 17 G/17G
17 POWDER, FOR SOLUTION ORAL DAILY PRN
Status: DISCONTINUED | OUTPATIENT
Start: 2021-03-08 | End: 2021-03-09

## 2021-03-08 RX ORDER — METHYLPREDNISOLONE SODIUM SUCCINATE 125 MG/2ML
60 INJECTION, POWDER, LYOPHILIZED, FOR SOLUTION INTRAMUSCULAR; INTRAVENOUS EVERY 8 HOURS
Status: DISCONTINUED | OUTPATIENT
Start: 2021-03-09 | End: 2021-03-09

## 2021-03-08 RX ORDER — IPRATROPIUM BROMIDE AND ALBUTEROL SULFATE 2.5; .5 MG/3ML; MG/3ML
3 SOLUTION RESPIRATORY (INHALATION) ONCE
Status: DISCONTINUED | OUTPATIENT
Start: 2021-03-08 | End: 2021-03-08

## 2021-03-08 RX ORDER — ENOXAPARIN SODIUM 100 MG/ML
40 INJECTION SUBCUTANEOUS NIGHTLY
Status: DISCONTINUED | OUTPATIENT
Start: 2021-03-08 | End: 2021-03-09

## 2021-03-08 RX ORDER — SODIUM CHLORIDE 9 MG/ML
INJECTION, SOLUTION INTRAVENOUS CONTINUOUS
Status: DISCONTINUED | OUTPATIENT
Start: 2021-03-08 | End: 2021-03-08

## 2021-03-08 RX ORDER — METOCLOPRAMIDE HYDROCHLORIDE 5 MG/ML
10 INJECTION INTRAMUSCULAR; INTRAVENOUS EVERY 8 HOURS PRN
Status: DISCONTINUED | OUTPATIENT
Start: 2021-03-08 | End: 2021-03-09

## 2021-03-08 RX ORDER — ALBUTEROL SULFATE 90 UG/1
2 AEROSOL, METERED RESPIRATORY (INHALATION) EVERY 6 HOURS PRN
Status: DISCONTINUED | OUTPATIENT
Start: 2021-03-08 | End: 2021-03-09

## 2021-03-08 RX ORDER — MELATONIN
3 NIGHTLY PRN
Status: DISCONTINUED | OUTPATIENT
Start: 2021-03-08 | End: 2021-03-09

## 2021-03-08 RX ORDER — ACETAMINOPHEN 325 MG/1
650 TABLET ORAL EVERY 6 HOURS PRN
Status: DISCONTINUED | OUTPATIENT
Start: 2021-03-08 | End: 2021-03-09

## 2021-03-08 RX ORDER — METHYLPREDNISOLONE SODIUM SUCCINATE 125 MG/2ML
125 INJECTION, POWDER, LYOPHILIZED, FOR SOLUTION INTRAMUSCULAR; INTRAVENOUS ONCE
Status: DISCONTINUED | OUTPATIENT
Start: 2021-03-08 | End: 2021-03-08

## 2021-03-08 RX ORDER — LORAZEPAM 2 MG/ML
1 INJECTION INTRAMUSCULAR ONCE
Status: COMPLETED | OUTPATIENT
Start: 2021-03-08 | End: 2021-03-08

## 2021-03-08 RX ORDER — ONDANSETRON 2 MG/ML
4 INJECTION INTRAMUSCULAR; INTRAVENOUS EVERY 6 HOURS PRN
Status: DISCONTINUED | OUTPATIENT
Start: 2021-03-08 | End: 2021-03-09

## 2021-03-08 RX ORDER — DOCUSATE SODIUM 100 MG/1
100 CAPSULE, LIQUID FILLED ORAL 2 TIMES DAILY PRN
Status: DISCONTINUED | OUTPATIENT
Start: 2021-03-08 | End: 2021-03-09

## 2021-03-08 RX ORDER — METHYLPREDNISOLONE SODIUM SUCCINATE 125 MG/2ML
125 INJECTION, POWDER, LYOPHILIZED, FOR SOLUTION INTRAMUSCULAR; INTRAVENOUS ONCE
Status: COMPLETED | OUTPATIENT
Start: 2021-03-08 | End: 2021-03-08

## 2021-03-08 RX ORDER — ALBUTEROL SULFATE 90 UG/1
8 AEROSOL, METERED RESPIRATORY (INHALATION) 4 TIMES DAILY
Status: DISCONTINUED | OUTPATIENT
Start: 2021-03-08 | End: 2021-03-09

## 2021-03-08 RX ORDER — AZITHROMYCIN 250 MG/1
500 TABLET, FILM COATED ORAL
Status: DISCONTINUED | OUTPATIENT
Start: 2021-03-08 | End: 2021-03-09

## 2021-03-08 RX ORDER — AZELASTINE 1 MG/ML
1 SPRAY, METERED NASAL 2 TIMES DAILY
Status: DISCONTINUED | OUTPATIENT
Start: 2021-03-08 | End: 2021-03-09

## 2021-03-08 NOTE — ED INITIAL ASSESSMENT (HPI)
Pt from [facility] with shortness of breath. Pt sat 89% on room air. Pt received 125mg of solumedrol prior to arrival Pt states he had a productive cough this am. Pt has hx of lung CA and uses O2 at night. Pt states he feels within his norm at this time.

## 2021-03-08 NOTE — TELEPHONE ENCOUNTER
Pt c/o SOB, wheezing and non productive cough x 3 days. Pt currently using O2 at 2 1/2 L NC, trelegy, incruse and albuterol with no relief. Pt was having difficulty completing sentences over the phone and audible SOB.      Denies fever, chest pain, bein

## 2021-03-08 NOTE — ED NOTES
Pt w/ very labored breathing, pt w/ cyanosis noted to hands and face.  Pt placed on 15L NRB and 911 called

## 2021-03-08 NOTE — ED PROVIDER NOTES
Patient Seen in: [facility]      History   Patient presents with:  Difficulty Breathing    Stated Complaint: SEVERE SOB     HPI/Subjective:   HPI    41-year-old male with history of COPD presents for evaluation of severe shortness of jasmyn Full range of motion throughout.         ED Course   Labs Reviewed - No data to display             MDM            Medications   methylPREDNISolone Sodium Succ (Solu-MEDROL) injection 125 mg (has no administration in time range)   ipratropium-albuterol (DUO

## 2021-03-08 NOTE — ED PROVIDER NOTES
Patient Seen in: [facility] Emergency Department      History   Patient presents with:  Difficulty Breathing    Stated Complaint: sob; from [facility]    HPI/Subjective:   HPI    52-year-old male presents with cough and shortness of breath.   History of CO daily    Drug use: No             Review of Systems    Positive for stated complaint: sob; from [facility]  Other systems are as noted in HPI. Constitutional and vital signs reviewed. All other systems reviewed and negative except as noted above.     Physic WITH DIFFERENTIAL WITH PLATELET.   Procedure                               Abnormality         Status                     ---------                               -----------         ------                     CBC W/ DIFFERENTIAL[[id number]]          Abnormal elevated blood pressure. Chest x-ray appears consistent with COPD. I believe is having a COPD exacerbation with hypoxemia. Discussed with [facility] pulmonary and will admit for further treatment. Solu-Medrol 125 mcg IV given. COVID-19 test negative.   Gabbi

## 2021-03-09 ENCOUNTER — TELEPHONE (OUTPATIENT)
Dept: HEMATOLOGY/ONCOLOGY | Facility: HOSPITAL | Age: 76
End: 2021-03-09

## 2021-03-09 ENCOUNTER — APPOINTMENT (OUTPATIENT)
Dept: ULTRASOUND IMAGING | Facility: HOSPITAL | Age: 76
End: 2021-03-09
Attending: INTERNAL MEDICINE
Payer: MEDICARE

## 2021-03-09 VITALS
BODY MASS INDEX: 29 KG/M2 | WEIGHT: 207.25 LBS | HEART RATE: 96 BPM | OXYGEN SATURATION: 92 % | RESPIRATION RATE: 17 BRPM | TEMPERATURE: 98 F | SYSTOLIC BLOOD PRESSURE: 149 MMHG | DIASTOLIC BLOOD PRESSURE: 86 MMHG

## 2021-03-09 LAB
BASOPHILS # BLD AUTO: 0.01 X10(3) UL (ref 0–0.2)
BASOPHILS NFR BLD AUTO: 0.1 %
DEPRECATED RDW RBC AUTO: 43.5 FL (ref 35.1–46.3)
EOSINOPHIL # BLD AUTO: 0 X10(3) UL (ref 0–0.7)
EOSINOPHIL NFR BLD AUTO: 0 %
ERYTHROCYTE [DISTWIDTH] IN BLOOD BY AUTOMATED COUNT: 13.3 % (ref 11–15)
HCT VFR BLD AUTO: 38.3 %
HGB BLD-MCNC: 13 G/DL
IMM GRANULOCYTES # BLD AUTO: 0.03 X10(3) UL (ref 0–1)
IMM GRANULOCYTES NFR BLD: 0.3 %
LYMPHOCYTES # BLD AUTO: 1.75 X10(3) UL (ref 1–4)
LYMPHOCYTES NFR BLD AUTO: 16.9 %
MCH RBC QN AUTO: 30.3 PG (ref 26–34)
MCHC RBC AUTO-ENTMCNC: 33.9 G/DL (ref 31–37)
MCV RBC AUTO: 89.3 FL
MONOCYTES # BLD AUTO: 0.57 X10(3) UL (ref 0.1–1)
MONOCYTES NFR BLD AUTO: 5.5 %
NEUTROPHILS # BLD AUTO: 8.01 X10 (3) UL (ref 1.5–7.7)
NEUTROPHILS # BLD AUTO: 8.01 X10(3) UL (ref 1.5–7.7)
NEUTROPHILS NFR BLD AUTO: 77.2 %
PLATELET # BLD AUTO: 238 10(3)UL (ref 150–450)
RBC # BLD AUTO: 4.29 X10(6)UL
WBC # BLD AUTO: 10.4 X10(3) UL (ref 4–11)

## 2021-03-09 PROCEDURE — 93970 EXTREMITY STUDY: CPT | Performed by: INTERNAL MEDICINE

## 2021-03-09 PROCEDURE — 99217 OBSERVATION CARE DISCHARGE: CPT | Performed by: HOSPITALIST

## 2021-03-09 RX ORDER — PREDNISONE 20 MG/1
40 TABLET ORAL
Status: DISCONTINUED | OUTPATIENT
Start: 2021-03-09 | End: 2021-03-09

## 2021-03-09 RX ORDER — PREDNISONE 10 MG/1
TABLET ORAL
Qty: 30 TABLET | Refills: 0 | Status: SHIPPED | OUTPATIENT
Start: 2021-03-10 | End: 2021-03-22

## 2021-03-09 NOTE — TELEPHONE ENCOUNTER
Isabell Ricardo is currently in [facility] room 3877. Phone number to the room [phone]. He was told by Dr Tamara Donato that Dr Bret Nova will be coming by today to see him before he is discharged. Isabell Ricardo was told they hope to discharge him soon.  He wa

## 2021-03-09 NOTE — TELEPHONE ENCOUNTER
I called Ar Telles and explained that Dr Christian Steinberg is in [place] today. She will not be able to see him today. If he is not discharged today, she can come by and see him tomorrow.      If he is discharged today and he feels up to coming for appointments zachary

## 2021-03-09 NOTE — CM/SW NOTE
Patient failed inpatient criteria. Second level of review completed and supports observation. UR committee in agreement. Discussed with Dr. Alanna Moore  who approves observation status. MOON given to the patient and order written.

## 2021-03-09 NOTE — CONSULTS
Pulmonary H&P/Consult       NAME: Elham Steven - ROOM: [address] [place] [address] - MRN: [id number] - Age: 77year old - :  1945    Date of Admission: 3/8/2021  1:39 PM  Admission Diagnosis: Hypoxemia [R09.02]  Elevated blood pressure reading [R03.0]  COPD 188-80.0-94 MCG/INH Inhalation Aerosol Powder, Breath Activated, Inhale 1 puff into the lungs daily. , Disp: 3 each, Rfl: 1, 3/8/2021 at 0900  Albuterol Sulfate HFA (VENTOLIN HFA) 108 (90 Base) MCG/ACT Inhalation Aero Soln, Inhale 2 puffs into the lungs chen Weight Concern: Not Asked        Special Diet: Not Asked        Back Care: Not Asked        Exercise: No        Bike Helmet: Not Asked        Seat Belt: Not Asked        Self-Exams: Not Asked    Social History Narrative      Not on file    Social Determina 3  Azelastine HCl 0.1 % Nasal Solution, 1 spray by Nasal route 2 (two) times daily. , Disp: 30 mL, Rfl: 0  ipratropium-albuterol 0.5-2.5 (3) MG/3ML Inhalation Solution, Take 3 mL by nebulization 4 (four) times daily. , Disp: 120 vial, Rfl: 2        Scheduled (93.4 kg)        Intake/Output Summary (Last 24 hours) at 3/9/2021 1024  Last data filed at 3/9/2021 0947  Gross per 24 hour   Intake 60 ml   Output 450 ml   Net -390 ml       /77 (BP Location: Left arm)   Pulse 100   Temp 98.2 °F (36.8 °C) (Oral) prednisone today  2. COPD  -cont trelegy  -stop azithromycin, trial of daliresp as opt  -BD protocol  -may need to look at chronic low dose prednisone in this pt who has had 3 flares in 4 mths and 4 flares in 6 mths  3.  Lung Nodules  -will discuss w/ onc,

## 2021-03-09 NOTE — PLAN OF CARE
NURSING DISCHARGE NOTE    Discharged Home via Ambulatory. Accompanied by RN  Belongings Taken by patient/family. Patient educated on dc medications, follow up appointments, and instructions. He verbalized understanding. All questions answered.   Esther Irving

## 2021-03-09 NOTE — H&P
EMILY HOSPITALIST  History and Physical     [facility] Patient Status:  Emergency    1945 MRN [id number]   Location [address] [facility] Attending Lynnetta Favre, MD   Hosp Day # 0 PCP Adilson Diaz MD     Chief Complai COLONOSCOPY, POSSIBLE BIOPSY, POSSIBLE POLYPECTOMY 20016 N/A 5/6/2013    Performed by Marisol Jack MD at [address] [place] [address]     • THORACOSCOPY/VATS Right 7/27/2020    [facility]formed [facility] Ana Rosa Hong., [facility] (37.4 °C) (Temporal)   Resp 22   Wt 207 lb 3.7 oz (94 kg)   SpO2 93%   BMI 28.50 kg/m²   General: No acute distress. Alert and oriented x 3. HEENT: Normocephalic atraumatic. Moist mucous membranes. EOM-I. PERRLA. Anicteric. Neck: Trachea midline. No JVD. leukocytosis  1. Monitor    Quality:  · DVT Prophylaxis: Lovenox  · CODE status: Full  · Vazquez: No  · If COVID testing is negative, may discontinue isolation: Yes     Plan of care discussed with patient, RN.     Jay Jay Valencia,   3/8/2021

## 2021-03-09 NOTE — PLAN OF CARE
NURSING ADMISSION NOTE      Patient admitted via Cart  Oriented to room. Safety precautions initiated. Bed in low position. Call light in reach.     Assumed care 2030  Alert and oriented x4   3L NC   ST on tele   No c/o of pain or SOB   Plan for US SWANSON

## 2021-03-10 ENCOUNTER — TUMOR BOARD CONFERENCE (OUTPATIENT)
Dept: RADIATION ONCOLOGY | Facility: HOSPITAL | Age: 76
End: 2021-03-10

## 2021-03-10 ENCOUNTER — PATIENT OUTREACH (OUTPATIENT)
Dept: CASE MANAGEMENT | Age: 76
End: 2021-03-10

## 2021-03-10 ENCOUNTER — TELEPHONE (OUTPATIENT)
Dept: HEMATOLOGY/ONCOLOGY | Facility: HOSPITAL | Age: 76
End: 2021-03-10

## 2021-03-10 DIAGNOSIS — Z02.9 ENCOUNTERS FOR ADMINISTRATIVE PURPOSE: ICD-10-CM

## 2021-03-10 PROCEDURE — 1111F DSCHRG MED/CURRENT MED MERGE: CPT

## 2021-03-10 NOTE — DISCHARGE SUMMARY
[facility] HOSPITALIST  DISCHARGE SUMMARY     Emmanuel Stout Patient Status:  Observation    1945 MRN [id number]   [facility] 7NE-A Attending No att. providers found   [facility] Day # 1 PCP Ryan Avalos MD     Date of Admission: 3/8/202 Plan was to follow-up with hematology/oncology as an outpatient and repeat CT chest in 3 months. Patient went to [facility] immediate care and was given 125 mg methylprednisolone. He was then transferred to [address] [place] [address] Dr MARTINEZ.   In the hospital, he was found to h 5     Azelastine HCl 0.1 % Soln  Commonly known as: ASTELIN      1 spray by Nasal route 2 (two) times daily.    Quantity: 30 mL  Refills: 0     azithromycin 250 MG Tabs  Commonly known as: ZITHROMAX  Notes to patient: Had dose yesterday 3/8      Take 1 tabl 3/9/2021    Time spent:  > 30 minutes

## 2021-03-10 NOTE — TELEPHONE ENCOUNTER
Spoke with patient, discussed recommendation for RT, will coordinate consult with Dr Monetta Kussmaul and patient to see Dr Dave Mayorga at the end of the RT. Appointment arranged for 3/10/21 9:00 am. Patient notified and will be there.      Patient has questions regardin

## 2021-03-10 NOTE — TELEPHONE ENCOUNTER
Lung tumor board    Proximal R lung mass is now 12 x 9 mm  9 x 10 mm 1 mo ago  It was suv 5 on pet  Surgery felt it is not postop related    Too risky to bx  Will discuss empiric SBRT as an outpatient

## 2021-03-11 ENCOUNTER — HOSPITAL ENCOUNTER (OUTPATIENT)
Dept: RADIATION ONCOLOGY | Facility: HOSPITAL | Age: 76
Discharge: HOME | End: 2021-03-11
Attending: RADIOLOGY
Payer: MEDICARE

## 2021-03-11 VITALS
SYSTOLIC BLOOD PRESSURE: 167 MMHG | HEIGHT: 72 IN | DIASTOLIC BLOOD PRESSURE: 89 MMHG | BODY MASS INDEX: 27.66 KG/M2 | HEART RATE: 97 BPM | OXYGEN SATURATION: 89 % | WEIGHT: 204.19 LBS | TEMPERATURE: 97 F | RESPIRATION RATE: 22 BRPM

## 2021-03-11 DIAGNOSIS — C34.31 PRIMARY MALIGNANT NEOPLASM OF RIGHT LOWER LOBE OF LUNG (HCC): Primary | ICD-10-CM

## 2021-03-11 PROCEDURE — 99214 OFFICE O/P EST MOD 30 MIN: CPT

## 2021-03-11 RX ORDER — RUFINAMIDE 40 MG/ML
1 SUSPENSION ORAL DAILY
COMMUNITY

## 2021-03-11 RX ORDER — BIOTIN 1 MG
2 TABLET ORAL DAILY
COMMUNITY

## 2021-03-11 RX ORDER — ALPRAZOLAM 0.5 MG/1
TABLET ORAL
Qty: 6 TABLET | Refills: 0 | Status: SHIPPED | OUTPATIENT
Start: 2021-03-11

## 2021-03-11 NOTE — PROGRESS NOTES
Nursing Consultation Note  Patient: Emmanuel Stout  YOB: 1945  Age: 77year old  Radiation Oncologist: Dr. Crissie Hatchet  Referring Physician: No ref.  provider found  Marv@[employer]  Consult Date: 3/11/2021      Chemotherapy: no  L Medication Sig Dispense Refill   • predniSONE 10 MG Oral Tab Take 4 tablets (40 mg total) by mouth daily with breakfast for 3 days, THEN 3 tablets (30 mg total) daily with breakfast for 3 days, THEN 2 tablets (20 mg total) daily with breakfast for 3 days MD at [facility], POSSIBLE BIOPSY, POSSIBLE POLYPECTOMY 57678 N/A 5/6/2013    Performed by Mitchell Trejo MD at [facility]   • INGUINAL HERNIA REPAIR     • THORACOSCOPY/VATS Right 7/27/2020    Performed by Camron Robles., J Transportation (Medical):       Lack of Transportation (Non-Medical):   Physical Activity:       Days of Exercise per Week:       Minutes of Exercise per Session:   Stress:       Feeling of Stress :   Social Connections:       Frequency of Communication wi

## 2021-03-11 NOTE — CONSULTS
[facility]     PATIENT:   Mariah Carias MD:  Archibald Nageotte, MD  DIAGNOSIS:   T1 N0 M0 presumed RLL lung cancer   CC:    Right lung cancer    HPI   71-year-old man here for consult.     Heavy smokin Chronic shortness of breath, cough, stable weight, stable appetite, recent COPD exacerbation  All other systems were reviewed and are negative.     PHYSICAL EXAM   ECO  GEN:  Well-appearing, NAD  HEENT:  No lymphadenopathy  CHEST:  Regular rate and rhy

## 2021-03-16 ENCOUNTER — HOSPITAL ENCOUNTER (OUTPATIENT)
Dept: RADIATION ONCOLOGY | Facility: HOSPITAL | Age: 76
Discharge: HOME | End: 2021-03-16
Attending: RADIOLOGY
Payer: MEDICARE

## 2021-03-16 PROCEDURE — 77334 RADIATION TREATMENT AID(S): CPT | Performed by: RADIOLOGY

## 2021-03-16 PROCEDURE — 77399 UNLISTED PX MED RADJ PHYSICS: CPT | Performed by: RADIOLOGY

## 2021-03-16 PROCEDURE — 77470 SPECIAL RADIATION TREATMENT: CPT | Performed by: RADIOLOGY

## 2021-03-18 ENCOUNTER — OFFICE VISIT (OUTPATIENT)
Dept: INTERNAL MEDICINE CLINIC | Facility: CLINIC | Age: 76
End: 2021-03-18
Payer: MEDICARE

## 2021-03-18 VITALS
TEMPERATURE: 98 F | BODY MASS INDEX: 27.72 KG/M2 | HEART RATE: 90 BPM | WEIGHT: 204.63 LBS | RESPIRATION RATE: 24 BRPM | OXYGEN SATURATION: 95 % | SYSTOLIC BLOOD PRESSURE: 136 MMHG | HEIGHT: 72 IN | DIASTOLIC BLOOD PRESSURE: 68 MMHG

## 2021-03-18 DIAGNOSIS — Z00.00 PREVENTATIVE HEALTH CARE: ICD-10-CM

## 2021-03-18 DIAGNOSIS — Z12.5 SCREENING FOR MALIGNANT NEOPLASM OF PROSTATE: ICD-10-CM

## 2021-03-18 DIAGNOSIS — C34.91 NON-SMALL CELL CANCER OF RIGHT LUNG (HCC): ICD-10-CM

## 2021-03-18 DIAGNOSIS — R73.03 PREDIABETES: ICD-10-CM

## 2021-03-18 DIAGNOSIS — J44.1 CHRONIC OBSTRUCTIVE PULMONARY DISEASE WITH ACUTE EXACERBATION (HCC): Primary | ICD-10-CM

## 2021-03-18 DIAGNOSIS — I10 BENIGN ESSENTIAL HTN: ICD-10-CM

## 2021-03-18 DIAGNOSIS — R09.81 NASAL CONGESTION: ICD-10-CM

## 2021-03-18 PROCEDURE — 1111F DSCHRG MED/CURRENT MED MERGE: CPT | Performed by: INTERNAL MEDICINE

## 2021-03-18 PROCEDURE — 99495 TRANSJ CARE MGMT MOD F2F 14D: CPT | Performed by: INTERNAL MEDICINE

## 2021-03-18 NOTE — PROGRESS NOTES
HPI:    Sonal Jama is a 77year old male here today for hospital follow up.    He was discharged from Inpatient hospital, [facility] to Home   Admission Date: 3/8/21   Discharge Date: 3/9/21  Hospital Discharge Diagnoses (since 2/16/2021): before hospitalization and on CTA chest done during hospitalization. He did see Radiation Oncology, plan is to start radiation treatments. Hypertension - reasonable reading today, borderline systolic blood pressure. Prediabetes - lifestyle controlled. ([facility] [address]     He  has a past surgical history that includes Inguinal hernia repair; colonoscopy,biopsy (5/6/2013); patient withough preoperative order for iv antibiotic surgical site infection prophylaxis. (5/6/2013); patient documented not to have experienced kg/m²    GENERAL: well developed, well nourished, in no apparent distress  HEENT: atraumatic, normocephalic, ears and throat are clear  EYES: PERRLA, EOMI, conjunctiva are clear  LUNGS: mild wheezing bilaterally  CARDIO: RRR without murmur  GI: good BS's, PSA SCREEN; Future      Orders Placed This Encounter      Lipid Panel [E]      Hemoglobin A1C [E]      PSA (Screening) [E]      Meds & Refills for this Visit:  Requested Prescriptions      No prescriptions requested or ordered in this encounter       Imagi

## 2021-03-18 NOTE — PATIENT INSTRUCTIONS
- Continue azelastine nasal spray. Let us know when you need a refill  - The Trelegy and Symbicort have very similar ingredients, so you should not take both (one or the other).   - Ok to use ipratropium nebulizer treatments if needed  - Ok to try Fracisco Ma

## 2021-03-19 PROCEDURE — 77338 DESIGN MLC DEVICE FOR IMRT: CPT | Performed by: RADIOLOGY

## 2021-03-19 PROCEDURE — 77301 RADIOTHERAPY DOSE PLAN IMRT: CPT | Performed by: RADIOLOGY

## 2021-03-19 PROCEDURE — 77300 RADIATION THERAPY DOSE PLAN: CPT | Performed by: RADIOLOGY

## 2021-03-19 PROCEDURE — 77293 RESPIRATOR MOTION MGMT SIMUL: CPT | Performed by: RADIOLOGY

## 2021-03-21 PROBLEM — R09.81 NASAL CONGESTION: Status: ACTIVE | Noted: 2021-03-21

## 2021-03-24 ENCOUNTER — APPOINTMENT (OUTPATIENT)
Dept: RADIATION ONCOLOGY | Facility: HOSPITAL | Age: 76
End: 2021-03-24
Attending: RADIOLOGY
Payer: MEDICARE

## 2021-03-24 ENCOUNTER — DOCUMENTATION ONLY (OUTPATIENT)
Dept: RADIATION ONCOLOGY | Facility: HOSPITAL | Age: 76
End: 2021-03-24

## 2021-03-24 NOTE — PROGRESS NOTES
Reviewing his plan, his target lesion is probably in the ultracentral category. Recent data from [facility] suggests 50 Gy in 5 fractions as a reasonable option.         Other data suggest hypofractionation, because of reports of complications with 5 fraction

## 2021-03-26 ENCOUNTER — HOSPITAL ENCOUNTER (OUTPATIENT)
Dept: RADIATION ONCOLOGY | Facility: HOSPITAL | Age: 76
Discharge: HOME | End: 2021-03-26
Attending: RADIOLOGY
Payer: MEDICARE

## 2021-03-26 PROCEDURE — 77373 STRTCTC BDY RAD THER TX DLVR: CPT | Performed by: RADIOLOGY

## 2021-03-29 ENCOUNTER — HOSPITAL ENCOUNTER (OUTPATIENT)
Dept: RADIATION ONCOLOGY | Facility: HOSPITAL | Age: 76
Discharge: HOME | End: 2021-03-29
Attending: RADIOLOGY
Payer: MEDICARE

## 2021-03-29 VITALS
TEMPERATURE: 98 F | DIASTOLIC BLOOD PRESSURE: 63 MMHG | OXYGEN SATURATION: 96 % | RESPIRATION RATE: 21 BRPM | SYSTOLIC BLOOD PRESSURE: 145 MMHG | HEART RATE: 81 BPM

## 2021-03-29 DIAGNOSIS — C34.31 PRIMARY MALIGNANT NEOPLASM OF RIGHT LOWER LOBE OF LUNG (HCC): Primary | ICD-10-CM

## 2021-03-29 PROCEDURE — 77373 STRTCTC BDY RAD THER TX DLVR: CPT | Performed by: RADIOLOGY

## 2021-03-29 NOTE — PROGRESS NOTES
[facility] Radiation Treatment Management Note 1-5    Patient:  Stuartw Grandchild  Age:  77year old  Visit Diagnosis:    1.  Primary malignant neoplasm of right lower lobe of lung ([address]      Primary Rad/Onc:  Dr. Dave Monahan

## 2021-03-30 NOTE — PATIENT INSTRUCTIONS
POST-RADIATION INSTRUCTIONS:   - SIDE EFFECTS OF RADIATION WILL GRADUALLY SUBSIDE, IT MAY TAKE UP TO 2 WEEKS POST-RADIATION FOR YOU TO NOTICE CHANGES; SUCH AS A DECREASE IN YOUR FATIGUE LEVEL.    - CALL IF YOU HAVE ANY QUESTIONS/CONCERNS REGARDING RADIATION

## 2021-03-31 ENCOUNTER — HOSPITAL ENCOUNTER (OUTPATIENT)
Dept: RADIATION ONCOLOGY | Facility: HOSPITAL | Age: 76
Discharge: HOME | End: 2021-03-31
Attending: RADIOLOGY
Payer: MEDICARE

## 2021-03-31 PROCEDURE — 77373 STRTCTC BDY RAD THER TX DLVR: CPT | Performed by: RADIOLOGY

## 2021-04-01 ENCOUNTER — HOSPITAL ENCOUNTER (OUTPATIENT)
Dept: RADIATION ONCOLOGY | Facility: HOSPITAL | Age: 76
Discharge: HOME | End: 2021-04-01
Attending: RADIOLOGY
Payer: MEDICARE

## 2021-04-02 ENCOUNTER — HOSPITAL ENCOUNTER (OUTPATIENT)
Dept: RADIATION ONCOLOGY | Facility: HOSPITAL | Age: 76
Discharge: HOME | End: 2021-04-02
Attending: RADIOLOGY
Payer: MEDICARE

## 2021-04-02 PROCEDURE — 77373 STRTCTC BDY RAD THER TX DLVR: CPT | Performed by: RADIOLOGY

## 2021-04-05 ENCOUNTER — DOCUMENTATION ONLY (OUTPATIENT)
Dept: RADIATION ONCOLOGY | Facility: HOSPITAL | Age: 76
End: 2021-04-05

## 2021-04-05 ENCOUNTER — HOSPITAL ENCOUNTER (OUTPATIENT)
Dept: RADIATION ONCOLOGY | Facility: HOSPITAL | Age: 76
Discharge: HOME | End: 2021-04-05
Attending: RADIOLOGY
Payer: MEDICARE

## 2021-04-05 PROCEDURE — 77373 STRTCTC BDY RAD THER TX DLVR: CPT | Performed by: RADIOLOGY

## 2021-04-09 NOTE — PROGRESS NOTES
[facility] RADIATION ONCOLOGY  TREATMENT SUMMARY     PATIENT:  Mel Jennings MD: Joan Mtz MD  DIAGNOSIS:  presumed RLL lung cancer    HISTORY   41-year-old man s/p RUL lobectomy and gwen dissection July 2020 for a 2 cm lexi

## 2021-04-21 ENCOUNTER — LAB ENCOUNTER (OUTPATIENT)
Dept: LAB | Age: 76
End: 2021-04-21
Attending: INTERNAL MEDICINE
Payer: MEDICARE

## 2021-04-21 DIAGNOSIS — Z00.00 PREVENTATIVE HEALTH CARE: ICD-10-CM

## 2021-04-21 DIAGNOSIS — Z12.5 SCREENING FOR MALIGNANT NEOPLASM OF PROSTATE: ICD-10-CM

## 2021-04-21 PROCEDURE — 80061 LIPID PANEL: CPT

## 2021-04-21 PROCEDURE — 36415 COLL VENOUS BLD VENIPUNCTURE: CPT

## 2021-04-21 PROCEDURE — 83036 HEMOGLOBIN GLYCOSYLATED A1C: CPT

## 2021-04-27 ENCOUNTER — HOSPITAL ENCOUNTER (OUTPATIENT)
Dept: RADIATION ONCOLOGY | Facility: HOSPITAL | Age: 76
Discharge: HOME | End: 2021-04-27
Attending: RADIOLOGY
Payer: MEDICARE

## 2021-04-27 VITALS
OXYGEN SATURATION: 93 % | TEMPERATURE: 98 F | HEART RATE: 110 BPM | SYSTOLIC BLOOD PRESSURE: 136 MMHG | DIASTOLIC BLOOD PRESSURE: 81 MMHG | RESPIRATION RATE: 20 BRPM

## 2021-04-27 DIAGNOSIS — C34.31 PRIMARY MALIGNANT NEOPLASM OF RIGHT LOWER LOBE OF LUNG (HCC): Primary | ICD-10-CM

## 2021-04-27 PROCEDURE — 99213 OFFICE O/P EST LOW 20 MIN: CPT

## 2021-04-27 NOTE — PROGRESS NOTES
[facility] RADIATION ONCOLOGY  FOLLOW UP     PATIENT:   Cash Gutierrez      5/1/1945    DIAGNOSIS:   R lung CA      CANCER HISTORY   78-year-old man s/p RUL lobectomy and gwen dissection July 2020 for a 2 cm adenocarcinoma, lipidic pattern.

## 2021-04-27 NOTE — PROGRESS NOTES
Nursing Follow-Up Note    Patient: Shala Ferro  YOB: 1945  Age: 77year old  Radiation Oncologist: Dr. Jelly Ma  Referring Physician: No ref. provider found  Chief Complaint: Patient presents with:   Follow - Up    Date: 4/27/2021

## 2021-05-10 ENCOUNTER — TELEPHONE (OUTPATIENT)
Dept: INTERNAL MEDICINE CLINIC | Facility: CLINIC | Age: 76
End: 2021-05-10

## 2021-07-06 ENCOUNTER — HOSPITAL ENCOUNTER (OUTPATIENT)
Dept: CT IMAGING | Facility: HOSPITAL | Age: 76
Discharge: HOME | End: 2021-07-06
Attending: RADIOLOGY
Payer: MEDICARE

## 2021-07-06 ENCOUNTER — TELEPHONE (OUTPATIENT)
Dept: RADIATION ONCOLOGY | Facility: HOSPITAL | Age: 76
End: 2021-07-06

## 2021-07-06 DIAGNOSIS — C34.31 PRIMARY MALIGNANT NEOPLASM OF RIGHT LOWER LOBE OF LUNG (HCC): Primary | ICD-10-CM

## 2021-07-06 DIAGNOSIS — C34.31 PRIMARY MALIGNANT NEOPLASM OF RIGHT LOWER LOBE OF LUNG (HCC): ICD-10-CM

## 2021-07-06 PROCEDURE — 71250 CT THORAX DX C-: CPT | Performed by: RADIOLOGY

## 2021-07-06 NOTE — TELEPHONE ENCOUNTER
TC to patient and wife    CT shows stable appearance of R LL nodule, hilar, superior seg, s/p SBRT    LLL opacity spont resolved; no active growing lung lesions; no clear increase in adenopathy   Pt clinically doing well    Repeat CT chest, this time w/ co

## 2021-09-29 ENCOUNTER — TELEPHONE (OUTPATIENT)
Dept: INTERNAL MEDICINE CLINIC | Facility: CLINIC | Age: 76
End: 2021-09-29

## 2021-09-29 NOTE — TELEPHONE ENCOUNTER
Spoke to pt and advised he is recommended to get 3rd Pfizer COVID vaccine per Dr Luis Vallejo as it has been 6 months since his 2nd dose. Provided phone # to schedule [phone] option 3. Pt instructed to call back with any issues.

## 2021-10-15 ENCOUNTER — IMMUNIZATION (OUTPATIENT)
Dept: LAB | Facility: HOSPITAL | Age: 76
End: 2021-10-15
Attending: EMERGENCY MEDICINE
Payer: MEDICARE

## 2021-10-15 DIAGNOSIS — Z23 NEED FOR VACCINATION: Primary | ICD-10-CM

## 2021-10-15 PROCEDURE — 0003A SARSCOV2 VAC 30MCG/0.3ML IM: CPT

## 2021-11-01 ENCOUNTER — TELEPHONE (OUTPATIENT)
Dept: RADIATION ONCOLOGY | Facility: HOSPITAL | Age: 76
End: 2021-11-01

## 2021-11-01 ENCOUNTER — HOSPITAL ENCOUNTER (OUTPATIENT)
Dept: CT IMAGING | Facility: HOSPITAL | Age: 76
Discharge: HOME | End: 2021-11-01
Attending: RADIOLOGY
Payer: MEDICARE

## 2021-11-01 DIAGNOSIS — C34.31 PRIMARY MALIGNANT NEOPLASM OF RIGHT LOWER LOBE OF LUNG (HCC): Primary | ICD-10-CM

## 2021-11-01 DIAGNOSIS — C34.31 PRIMARY MALIGNANT NEOPLASM OF RIGHT LOWER LOBE OF LUNG (HCC): ICD-10-CM

## 2021-11-01 PROCEDURE — 71260 CT THORAX DX C+: CPT | Performed by: RADIOLOGY

## 2021-11-01 PROCEDURE — 82565 ASSAY OF CREATININE: CPT

## 2021-11-01 NOTE — TELEPHONE ENCOUNTER
TC to patient    CT looks good; stable appearance of nodule treated in Apr 2021 with SBRT in R suprahilar lung    Would repeat in Apr 2022    Ordered placed

## 2021-12-20 RX ORDER — ALBUTEROL SULFATE 90 UG/1
2 AEROSOL, METERED RESPIRATORY (INHALATION) EVERY 6 HOURS PRN
Qty: 18 G | Refills: 1 | Status: SHIPPED | OUTPATIENT
Start: 2021-12-20

## 2021-12-20 NOTE — TELEPHONE ENCOUNTER
LOV: 3/18/2021 with Dr. Carlotta De La Cruz  RTC: 6 months  Last Relevant Labs: 4/21/2021  Filled: 12/11/2020    #18 g with 1 refill    Future Appointments   Date Time Provider Salvatore Jon   1/13/2022  9:30 AM Ilda Farley MD [facility]   1/31/

## 2022-01-01 ENCOUNTER — TELEPHONE (OUTPATIENT)
Dept: RADIATION ONCOLOGY | Facility: HOSPITAL | Age: 77
End: 2022-01-01

## 2022-03-14 NOTE — TELEPHONE ENCOUNTER
Tc from pt requesting CT chest be reordered so he can schedule for May 2022. Current order expires April 2022. MD aware.

## 2022-03-23 ENCOUNTER — TELEPHONE (OUTPATIENT)
Dept: INTERNAL MEDICINE CLINIC | Facility: CLINIC | Age: 77
End: 2022-03-23

## 2022-05-02 ENCOUNTER — TELEPHONE (OUTPATIENT)
Dept: RADIATION ONCOLOGY | Facility: HOSPITAL | Age: 77
End: 2022-05-02

## 2022-05-02 ENCOUNTER — HOSPITAL ENCOUNTER (OUTPATIENT)
Dept: CT IMAGING | Facility: HOSPITAL | Age: 77
Discharge: HOME | End: 2022-05-02
Attending: RADIOLOGY
Payer: MEDICARE

## 2022-05-02 DIAGNOSIS — C34.31 PRIMARY MALIGNANT NEOPLASM OF RIGHT LOWER LOBE OF LUNG (HCC): ICD-10-CM

## 2022-05-02 PROCEDURE — 71260 CT THORAX DX C+: CPT | Performed by: RADIOLOGY

## 2022-05-02 RX ORDER — IOHEXOL 350 MG/ML
75 INJECTION, SOLUTION INTRAVENOUS
Status: COMPLETED | OUTPATIENT
Start: 2022-05-02 | End: 2022-05-02

## 2022-05-02 RX ADMIN — IOHEXOL 75 ML: 350 INJECTION, SOLUTION INTRAVENOUS at 07:46:00

## 2022-05-02 NOTE — TELEPHONE ENCOUNTER
TC to patient    CT reviewed  Likely SBRT induced lung changes rather than progressing/recurring tumor in right perihilar lung    Will get PET scan (this tumor had SUV 5 when it was 9 mm in the past)    Pt reports stable breathing and cough, no hemoptysis    If PET concerning, will present in lung conference

## 2022-05-11 ENCOUNTER — HOSPITAL ENCOUNTER (OUTPATIENT)
Dept: NUCLEAR MEDICINE | Facility: HOSPITAL | Age: 77
Discharge: HOME | End: 2022-05-11
Attending: RADIOLOGY
Payer: MEDICARE

## 2022-05-11 ENCOUNTER — TELEPHONE (OUTPATIENT)
Dept: RADIATION ONCOLOGY | Facility: HOSPITAL | Age: 77
End: 2022-05-11

## 2022-05-11 DIAGNOSIS — C34.31 PRIMARY MALIGNANT NEOPLASM OF RIGHT LOWER LOBE OF LUNG (HCC): ICD-10-CM

## 2022-05-11 LAB — GLUCOSE BLD-MCNC: 101 MG/DL (ref 70–99)

## 2022-05-11 PROCEDURE — 82962 GLUCOSE BLOOD TEST: CPT

## 2022-05-11 PROCEDURE — 78815 PET IMAGE W/CT SKULL-THIGH: CPT | Performed by: RADIOLOGY

## 2022-05-18 ENCOUNTER — TELEPHONE (OUTPATIENT)
Dept: RADIATION ONCOLOGY | Facility: HOSPITAL | Age: 77
End: 2022-05-18

## 2022-05-18 DIAGNOSIS — C34.31 PRIMARY MALIGNANT NEOPLASM OF RIGHT LOWER LOBE OF LUNG (HCC): Primary | ICD-10-CM

## 2022-05-18 NOTE — TELEPHONE ENCOUNTER
TC to patient  Discussed lung conf discussion    PET SUV is only 3.7 borderline  Could be from RT 1 year ago  Increased density could be RT fibrosis    Broch w/ bx considered  Alternative is close 2-3 mo CT w/ contrast    Discussed pros/cons w/ patient  Decided in favor CT in 2-3 mo  If stable, then likely RT change  If growth, then would need bx      Addendum:  Will review R cervical/periparotid node in general TB.

## 2022-05-25 ENCOUNTER — TELEPHONE (OUTPATIENT)
Dept: RADIATION ONCOLOGY | Facility: HOSPITAL | Age: 77
End: 2022-05-25

## 2022-05-25 DIAGNOSIS — R59.0 CERVICAL ADENOPATHY: Primary | ICD-10-CM

## 2022-05-25 NOTE — TELEPHONE ENCOUNTER
Tc to patient  He will schedule bx now and CT chest in mid Aug    He cannot feel the clint parotid nodule see on PET and has no symptoms

## 2022-06-04 ENCOUNTER — LAB ENCOUNTER (OUTPATIENT)
Dept: LAB | Facility: HOSPITAL | Age: 77
End: 2022-06-04
Attending: RADIOLOGY
Payer: MEDICARE

## 2022-06-04 DIAGNOSIS — R59.0 CERVICAL ADENOPATHY: ICD-10-CM

## 2022-06-04 LAB — SARS-COV-2 RNA RESP QL NAA+PROBE: NOT DETECTED

## 2022-06-07 ENCOUNTER — HOSPITAL ENCOUNTER (OUTPATIENT)
Dept: ULTRASOUND IMAGING | Facility: HOSPITAL | Age: 77
Discharge: HOME | End: 2022-06-07
Attending: RADIOLOGY
Payer: MEDICARE

## 2022-06-07 ENCOUNTER — NURSE ONLY (OUTPATIENT)
Dept: LAB | Facility: HOSPITAL | Age: 77
End: 2022-06-07
Attending: RADIOLOGY
Payer: MEDICARE

## 2022-06-07 DIAGNOSIS — R59.0 CERVICAL ADENOPATHY: Primary | ICD-10-CM

## 2022-06-07 DIAGNOSIS — R59.0 CERVICAL ADENOPATHY: ICD-10-CM

## 2022-06-07 PROCEDURE — 88184 FLOWCYTOMETRY/ TC 1 MARKER: CPT

## 2022-06-07 PROCEDURE — 88185 FLOWCYTOMETRY/TC ADD-ON: CPT

## 2022-06-07 PROCEDURE — 88173 CYTOPATH EVAL FNA REPORT: CPT | Performed by: RADIOLOGY

## 2022-06-07 PROCEDURE — 10005 FNA BX W/US GDN 1ST LES: CPT | Performed by: RADIOLOGY

## 2022-06-07 PROCEDURE — 88305 TISSUE EXAM BY PATHOLOGIST: CPT | Performed by: RADIOLOGY

## 2022-06-07 NOTE — PROCEDURES
[facility]  Pre-Procedure Note    Name: Domitila Wade  MRN#: [id number]  : 1945    Procedure:  Ultrasound Guided Right cervical adenopathy FNA    Indication: Lung cancer    Allergies:    No Known Allergies    Pertinent Medications:    Is patient on any Aspirin, Coumadin, or any other Anticoagulations/Antiplatelet medications? no    Adverse Reactions to Medication:  no    Mental Status:  Alert and Oriented      Health Status: Acceptable for Procedure    Impression and Plans:    Adenopathy with history of lung cancer    I have reviewed the above information prior to procedure. I have discussed the risks and benefits and alternatives with the patient/family. They understand and agree to proceed with plan of care.     Danyel Nye MD  2022  11:04 AM

## 2022-06-07 NOTE — PROCEDURES
[address] Patient Status:  Outpatient    1945 MRN [id number]   Location [address] [place] [address] Attending Kathleen Jordan MD   Hosp Day # 0 PCP Kolby Tan MD         Brief Procedure Report    Pre-Operative Diagnosis: Lung cancer. Right cervical lymphadenopathy which requires fine needle aspiration    Post-Operative Diagnosis: Same as above. Procedure Performed: Ultrasound guided fine needle aspiration    Anesthesia: 1% lidocaine    EBL: <5BS    Complications: None      Summary of Case:   6 passes performed with a 25g needle. Pathologist present.        Magda Chau

## 2022-06-09 LAB
CD10 CELLS NFR SPEC: <1 %
CD11C CELLS NFR SPEC: 3 %
CD14 CELLS NFR SPEC: <1 %
CD19 CELLS NFR SPEC: 14 %
CD19/CD10 CELLS: <1 %
CD20 CELLS NFR SPEC: 14 %
CD22 CELLS NFR SPEC: 15 %
CD23 CELLS NFR SPEC: 3 %
CD3 CELLS NFR SPEC: 86 %
CD3+CD4+ CELLS NFR SPEC: 79 %
CD3+CD4+ CELLS/CD3+CD8+ CLL SPEC: 13.2
CD3+CD8+ CELLS NFR SPEC: 6 %
CD45 CELLS NFR SPEC: 100 %
CD5 CELLS NFR SPEC: 84 %
CD5/CD19 CELLS: 1 %
CD56 CELLS NFR SPEC: 2 %
CD7 CELLS NFR SPEC: 77 %
CELL SURF KAPPA/LAMBDA RATIO: 0.9
CELL SURF LAMBDA LIGHT CHAIN: 7 %
CELL SURFACE KAPPA LIGHT CHAIN: 6 %
FMC7 CELLS NFR SPEC: 3 %

## 2022-06-30 ENCOUNTER — OFFICE VISIT (OUTPATIENT)
Dept: INTERNAL MEDICINE CLINIC | Facility: CLINIC | Age: 77
End: 2022-06-30
Payer: MEDICARE

## 2022-06-30 VITALS
WEIGHT: 193.38 LBS | OXYGEN SATURATION: 96 % | BODY MASS INDEX: 27.07 KG/M2 | TEMPERATURE: 98 F | HEART RATE: 88 BPM | HEIGHT: 71 IN | DIASTOLIC BLOOD PRESSURE: 70 MMHG | SYSTOLIC BLOOD PRESSURE: 142 MMHG | RESPIRATION RATE: 20 BRPM

## 2022-06-30 DIAGNOSIS — J42 CHRONIC BRONCHITIS, UNSPECIFIED CHRONIC BRONCHITIS TYPE (HCC): ICD-10-CM

## 2022-06-30 DIAGNOSIS — I10 BENIGN ESSENTIAL HTN: ICD-10-CM

## 2022-06-30 DIAGNOSIS — R73.03 PREDIABETES: ICD-10-CM

## 2022-06-30 DIAGNOSIS — Z00.00 PREVENTATIVE HEALTH CARE: Primary | ICD-10-CM

## 2022-06-30 DIAGNOSIS — Z12.5 SCREENING FOR MALIGNANT NEOPLASM OF PROSTATE: ICD-10-CM

## 2022-06-30 DIAGNOSIS — C34.91 NON-SMALL CELL CANCER OF RIGHT LUNG (HCC): ICD-10-CM

## 2022-06-30 NOTE — PATIENT INSTRUCTIONS
- We will monitor blood pressure for now  - Get blood tests done when fasting (water and medications only for 8 hours). Our lab opens at 7 AM on weekdays, 7:30 AM on Saturdays.  - I recommend you get your next COVID booster shot in August after your CT scan  - Follow up with Dr. Ayan Dickerson after your repeat CT scan  - Follow up with me in 6 months (December/January) for chronic issues/blood pressure re-check. It was a pleasure seeing you in the clinic today. Thank you for choosing the [facility] office for your healthcare needs. Please call at [phone] with any questions or concerns.     Rodrigo Rasmussen MD

## 2022-07-01 ENCOUNTER — LAB ENCOUNTER (OUTPATIENT)
Dept: LAB | Age: 77
End: 2022-07-01
Attending: INTERNAL MEDICINE
Payer: MEDICARE

## 2022-07-01 DIAGNOSIS — Z00.00 PREVENTATIVE HEALTH CARE: ICD-10-CM

## 2022-07-01 DIAGNOSIS — Z12.5 SCREENING FOR MALIGNANT NEOPLASM OF PROSTATE: ICD-10-CM

## 2022-07-01 DIAGNOSIS — R73.03 PREDIABETES: ICD-10-CM

## 2022-07-01 DIAGNOSIS — I10 BENIGN ESSENTIAL HTN: ICD-10-CM

## 2022-07-01 LAB
ALBUMIN SERPL-MCNC: 3.8 G/DL (ref 3.4–5)
ALBUMIN/GLOB SERPL: 1.2 {RATIO} (ref 1–2)
ALP LIVER SERPL-CCNC: 105 U/L
ALT SERPL-CCNC: 21 U/L
ANION GAP SERPL CALC-SCNC: 5 MMOL/L (ref 0–18)
AST SERPL-CCNC: 15 U/L (ref 15–37)
BILIRUB SERPL-MCNC: 0.7 MG/DL (ref 0.1–2)
BUN BLD-MCNC: 19 MG/DL (ref 7–18)
CALCIUM BLD-MCNC: 9.1 MG/DL (ref 8.5–10.1)
CHLORIDE SERPL-SCNC: 111 MMOL/L (ref 98–112)
CHOLEST SERPL-MCNC: 178 MG/DL (ref ?–200)
CO2 SERPL-SCNC: 24 MMOL/L (ref 21–32)
COMPLEXED PSA SERPL-MCNC: 0.5 NG/ML (ref ?–4)
CREAT BLD-MCNC: 1.07 MG/DL
EST. AVERAGE GLUCOSE BLD GHB EST-MCNC: 114 MG/DL (ref 68–126)
FASTING PATIENT LIPID ANSWER: YES
FASTING STATUS PATIENT QL REPORTED: YES
GLOBULIN PLAS-MCNC: 3.1 G/DL (ref 2.8–4.4)
GLUCOSE BLD-MCNC: 104 MG/DL (ref 70–99)
HBA1C MFR BLD: 5.6 % (ref ?–5.7)
HDLC SERPL-MCNC: 34 MG/DL (ref 40–59)
LDLC SERPL CALC-MCNC: 119 MG/DL (ref ?–100)
NONHDLC SERPL-MCNC: 144 MG/DL (ref ?–130)
OSMOLALITY SERPL CALC.SUM OF ELEC: 293 MOSM/KG (ref 275–295)
POTASSIUM SERPL-SCNC: 3.9 MMOL/L (ref 3.5–5.1)
PROT SERPL-MCNC: 6.9 G/DL (ref 6.4–8.2)
SODIUM SERPL-SCNC: 140 MMOL/L (ref 136–145)
TRIGL SERPL-MCNC: 140 MG/DL (ref 30–149)
VLDLC SERPL CALC-MCNC: 25 MG/DL (ref 0–30)

## 2022-07-01 PROCEDURE — 36415 COLL VENOUS BLD VENIPUNCTURE: CPT

## 2022-07-01 PROCEDURE — 80053 COMPREHEN METABOLIC PANEL: CPT

## 2022-07-01 PROCEDURE — 80061 LIPID PANEL: CPT

## 2022-07-01 PROCEDURE — 83036 HEMOGLOBIN GLYCOSYLATED A1C: CPT

## 2022-07-03 PROBLEM — C34.90 NON-SMALL CELL LUNG CANCER (HCC): Chronic | Status: ACTIVE | Noted: 2020-06-29

## 2022-08-08 ENCOUNTER — HOSPITAL ENCOUNTER (OUTPATIENT)
Dept: CT IMAGING | Facility: HOSPITAL | Age: 77
Discharge: HOME | End: 2022-08-08
Attending: RADIOLOGY
Payer: MEDICARE

## 2022-08-08 DIAGNOSIS — C34.31 PRIMARY MALIGNANT NEOPLASM OF RIGHT LOWER LOBE OF LUNG (HCC): ICD-10-CM

## 2022-08-08 PROCEDURE — 71260 CT THORAX DX C+: CPT | Performed by: RADIOLOGY

## 2022-08-08 RX ORDER — IOHEXOL 350 MG/ML
75 INJECTION, SOLUTION INTRAVENOUS
Status: COMPLETED | OUTPATIENT
Start: 2022-08-08 | End: 2022-08-08

## 2022-08-08 RX ADMIN — IOHEXOL 75 ML: 350 INJECTION, SOLUTION INTRAVENOUS at 08:27:00

## 2022-08-18 ENCOUNTER — TELEPHONE (OUTPATIENT)
Dept: RADIATION ONCOLOGY | Facility: HOSPITAL | Age: 77
End: 2022-08-18

## 2022-08-18 DIAGNOSIS — C34.31 PRIMARY MALIGNANT NEOPLASM OF RIGHT LOWER LOBE OF LUNG (HCC): Primary | ICD-10-CM

## 2022-11-15 ENCOUNTER — TELEPHONE (OUTPATIENT)
Dept: INTERNAL MEDICINE CLINIC | Facility: CLINIC | Age: 77
End: 2022-11-15

## 2022-11-15 NOTE — TELEPHONE ENCOUNTER
[phone] spoke to pt, he stated he already cancelled his appt for tomorrow's vaccination. Gave him the CDC's recommendation to get vaccine/booster 90 days after Covid infection. Pt v/u and thanked us for the call back.

## 2022-11-15 NOTE — TELEPHONE ENCOUNTER
Pt was covid + 3 1/2 weeks ago. No sxs were ever present and he had no idea. After 5 days he was negative. He has booster vaccine scheduled for tomorrow. Wonders if he needs to reschedule.  Wants to know time frame of when he can receive booster after being covid +     Please advise

## 2022-12-16 RX ORDER — ALBUTEROL SULFATE 90 UG/1
2 AEROSOL, METERED RESPIRATORY (INHALATION) EVERY 6 HOURS PRN
Qty: 18 G | Refills: 1 | Status: SHIPPED | OUTPATIENT
Start: 2022-12-16

## 2022-12-16 NOTE — TELEPHONE ENCOUNTER
Ventolin HFA  Asthma & COPD Medication Protocol Failed 12/16/2022 12:01 PM    Asthma Action Score greater than or equal to 20    AAP/ACT given in last 12 months   Filled 12-20-21  Qty 18 g  1 refill  No upcoming appt.   LOV 6-30-22 RP

## 2023-01-01 ENCOUNTER — APPOINTMENT (OUTPATIENT)
Dept: CT IMAGING | Facility: HOSPITAL | Age: 78
End: 2023-01-01
Attending: STUDENT IN AN ORGANIZED HEALTH CARE EDUCATION/TRAINING PROGRAM
Payer: MEDICARE

## 2023-01-01 ENCOUNTER — APPOINTMENT (OUTPATIENT)
Dept: GENERAL RADIOLOGY | Facility: HOSPITAL | Age: 78
End: 2023-01-01
Attending: Other
Payer: MEDICARE

## 2023-01-01 ENCOUNTER — APPOINTMENT (OUTPATIENT)
Dept: CT IMAGING | Facility: HOSPITAL | Age: 78
End: 2023-01-01
Payer: MEDICARE

## 2023-01-01 ENCOUNTER — APPOINTMENT (OUTPATIENT)
Dept: GENERAL RADIOLOGY | Facility: HOSPITAL | Age: 78
End: 2023-01-01
Attending: INTERNAL MEDICINE
Payer: MEDICARE

## 2023-01-01 ENCOUNTER — APPOINTMENT (OUTPATIENT)
Dept: GENERAL RADIOLOGY | Facility: HOSPITAL | Age: 78
End: 2023-01-01
Attending: STUDENT IN AN ORGANIZED HEALTH CARE EDUCATION/TRAINING PROGRAM
Payer: MEDICARE

## 2023-01-01 ENCOUNTER — APPOINTMENT (OUTPATIENT)
Dept: GENERAL RADIOLOGY | Facility: HOSPITAL | Age: 78
End: 2023-01-01
Attending: NURSE PRACTITIONER
Payer: MEDICARE

## 2023-01-01 ENCOUNTER — HOSPITAL ENCOUNTER (OUTPATIENT)
Dept: CT IMAGING | Facility: HOSPITAL | Age: 78
Discharge: HOME | End: 2023-01-01
Attending: RADIOLOGY
Payer: MEDICARE

## 2023-01-01 ENCOUNTER — HOSPITAL ENCOUNTER (INPATIENT)
Facility: HOSPITAL | Age: 78
LOS: 5 days | End: 2023-01-01
Attending: STUDENT IN AN ORGANIZED HEALTH CARE EDUCATION/TRAINING PROGRAM | Admitting: HOSPITALIST
Payer: MEDICARE

## 2023-01-01 ENCOUNTER — APPOINTMENT (OUTPATIENT)
Dept: CT IMAGING | Facility: HOSPITAL | Age: 78
End: 2023-01-01
Attending: PHYSICIAN ASSISTANT
Payer: MEDICARE

## 2023-01-01 ENCOUNTER — APPOINTMENT (OUTPATIENT)
Dept: CV DIAGNOSTICS | Facility: HOSPITAL | Age: 78
End: 2023-01-01
Attending: INTERNAL MEDICINE
Payer: MEDICARE

## 2023-01-01 ENCOUNTER — APPOINTMENT (OUTPATIENT)
Dept: CT IMAGING | Facility: HOSPITAL | Age: 78
End: 2023-01-01
Attending: NURSE PRACTITIONER
Payer: MEDICARE

## 2023-01-01 ENCOUNTER — APPOINTMENT (OUTPATIENT)
Dept: ULTRASOUND IMAGING | Facility: HOSPITAL | Age: 78
End: 2023-01-01
Attending: NEUROLOGICAL SURGERY
Payer: MEDICARE

## 2023-01-01 VITALS
SYSTOLIC BLOOD PRESSURE: 156 MMHG | BODY MASS INDEX: 25 KG/M2 | OXYGEN SATURATION: 47 % | DIASTOLIC BLOOD PRESSURE: 77 MMHG | WEIGHT: 181.44 LBS | RESPIRATION RATE: 23 BRPM | HEART RATE: 134 BPM | TEMPERATURE: 98 F

## 2023-01-01 DIAGNOSIS — I62.9 INTRACRANIAL HEMORRHAGE (HCC): Primary | ICD-10-CM

## 2023-01-01 DIAGNOSIS — C34.31 PRIMARY MALIGNANT NEOPLASM OF RIGHT LOWER LOBE OF LUNG (HCC): ICD-10-CM

## 2023-01-01 DIAGNOSIS — R58 HEMORRHAGE: ICD-10-CM

## 2023-01-01 LAB
ADENOVIRUS PCR:: NOT DETECTED
ALBUMIN SERPL-MCNC: 4.2 G/DL (ref 3.4–5)
ALBUMIN/GLOB SERPL: 1.2 {RATIO} (ref 1–2)
ALP LIVER SERPL-CCNC: 122 U/L
ALT SERPL-CCNC: 24 U/L
ANION GAP SERPL CALC-SCNC: 10 MMOL/L (ref 0–18)
ANION GAP SERPL CALC-SCNC: 4 MMOL/L (ref 0–18)
ANION GAP SERPL CALC-SCNC: 6 MMOL/L (ref 0–18)
ANION GAP SERPL CALC-SCNC: 9 MMOL/L (ref 0–18)
ANTIBODY SCREEN: NEGATIVE
APTT PPP: 32.2 SECONDS (ref 23.3–35.6)
AST SERPL-CCNC: 21 U/L (ref 15–37)
ATRIAL RATE: 91 BPM
B PARAPERT DNA SPEC QL NAA+PROBE: NOT DETECTED
B PERT DNA SPEC QL NAA+PROBE: NOT DETECTED
BASE EXCESS BLD CALC-SCNC: -3 MMOL/L
BASE EXCESS BLDA CALC-SCNC: -2 MMOL/L (ref ?–2)
BASE EXCESS BLDA CALC-SCNC: -4.2 MMOL/L (ref ?–2)
BASE EXCESS BLDA CALC-SCNC: -5.1 MMOL/L (ref ?–2)
BASE EXCESS BLDA CALC-SCNC: -6.3 MMOL/L (ref ?–2)
BASE EXCESS BLDA CALC-SCNC: 0.5 MMOL/L (ref ?–2)
BASOPHILS # BLD AUTO: 0.02 X10(3) UL (ref 0–0.2)
BASOPHILS # BLD AUTO: 0.03 X10(3) UL (ref 0–0.2)
BASOPHILS # BLD AUTO: 0.08 X10(3) UL (ref 0–0.2)
BASOPHILS NFR BLD AUTO: 0.1 %
BASOPHILS NFR BLD AUTO: 0.2 %
BASOPHILS NFR BLD AUTO: 0.2 %
BASOPHILS NFR BLD AUTO: 0.3 %
BASOPHILS NFR BLD AUTO: 0.5 %
BILIRUB SERPL-MCNC: 0.4 MG/DL (ref 0.1–2)
BODY TEMPERATURE: 98.6 F
BUN BLD-MCNC: 24 MG/DL (ref 7–18)
BUN BLD-MCNC: 25 MG/DL (ref 7–18)
BUN BLD-MCNC: 25 MG/DL (ref 7–18)
BUN BLD-MCNC: 27 MG/DL (ref 7–18)
BUN BLD-MCNC: 32 MG/DL (ref 7–18)
BUN BLD-MCNC: 34 MG/DL (ref 7–18)
C PNEUM DNA SPEC QL NAA+PROBE: NOT DETECTED
CA-I BLD-SCNC: 1.2 MMOL/L (ref 0.95–1.32)
CA-I BLD-SCNC: 1.21 MMOL/L (ref 0.95–1.32)
CA-I BLD-SCNC: 1.22 MMOL/L (ref 1.12–1.32)
CALCIUM BLD-MCNC: 8 MG/DL (ref 8.5–10.1)
CALCIUM BLD-MCNC: 8.3 MG/DL (ref 8.5–10.1)
CALCIUM BLD-MCNC: 8.3 MG/DL (ref 8.5–10.1)
CALCIUM BLD-MCNC: 8.5 MG/DL (ref 8.5–10.1)
CALCIUM BLD-MCNC: 8.5 MG/DL (ref 8.5–10.1)
CALCIUM BLD-MCNC: 9.4 MG/DL (ref 8.5–10.1)
CHLORIDE SERPL-SCNC: 106 MMOL/L (ref 98–112)
CHLORIDE SERPL-SCNC: 109 MMOL/L (ref 98–112)
CHLORIDE SERPL-SCNC: 114 MMOL/L (ref 98–112)
CHLORIDE SERPL-SCNC: 117 MMOL/L (ref 98–112)
CHLORIDE SERPL-SCNC: 117 MMOL/L (ref 98–112)
CHLORIDE SERPL-SCNC: 118 MMOL/L (ref 98–112)
CHOLEST SERPL-MCNC: 152 MG/DL (ref ?–200)
CO2 BLD-SCNC: 24 MMOL/L (ref 22–32)
CO2 SERPL-SCNC: 20 MMOL/L (ref 21–32)
CO2 SERPL-SCNC: 20 MMOL/L (ref 21–32)
CO2 SERPL-SCNC: 21 MMOL/L (ref 21–32)
CO2 SERPL-SCNC: 23 MMOL/L (ref 21–32)
CO2 SERPL-SCNC: 23 MMOL/L (ref 21–32)
CO2 SERPL-SCNC: 24 MMOL/L (ref 21–32)
COHGB MFR BLD: 0.6 % SAT (ref 0–3)
COHGB MFR BLD: 0.7 % SAT (ref 0–3)
COHGB MFR BLD: 0.8 % SAT (ref 0–3)
CORONAVIRUS 229E PCR:: NOT DETECTED
CORONAVIRUS HKU1 PCR:: NOT DETECTED
CORONAVIRUS NL63 PCR:: NOT DETECTED
CORONAVIRUS OC43 PCR:: NOT DETECTED
CREAT BLD-MCNC: 1.37 MG/DL
CREAT BLD-MCNC: 1.42 MG/DL
CREAT BLD-MCNC: 1.51 MG/DL
CREAT BLD-MCNC: 1.57 MG/DL
CREAT BLD-MCNC: 1.6 MG/DL
CREAT BLD-MCNC: 1.71 MG/DL
CREAT BLD-MCNC: 1.9 MG/DL
DEPRECATED HBV CORE AB SER IA-ACNC: 147.5 NG/ML
EOSINOPHIL # BLD AUTO: 0 X10(3) UL (ref 0–0.7)
EOSINOPHIL # BLD AUTO: 0.01 X10(3) UL (ref 0–0.7)
EOSINOPHIL # BLD AUTO: 0.1 X10(3) UL (ref 0–0.7)
EOSINOPHIL # BLD AUTO: 0.3 X10(3) UL (ref 0–0.7)
EOSINOPHIL # BLD AUTO: 0.54 X10(3) UL (ref 0–0.7)
EOSINOPHIL NFR BLD AUTO: 0 %
EOSINOPHIL NFR BLD AUTO: 0.1 %
EOSINOPHIL NFR BLD AUTO: 0.8 %
EOSINOPHIL NFR BLD AUTO: 2.6 %
EOSINOPHIL NFR BLD AUTO: 3.5 %
ERYTHROCYTE [DISTWIDTH] IN BLOOD BY AUTOMATED COUNT: 12.6 %
ERYTHROCYTE [DISTWIDTH] IN BLOOD BY AUTOMATED COUNT: 12.7 %
ERYTHROCYTE [DISTWIDTH] IN BLOOD BY AUTOMATED COUNT: 13.1 %
ERYTHROCYTE [DISTWIDTH] IN BLOOD BY AUTOMATED COUNT: 13.1 %
ERYTHROCYTE [DISTWIDTH] IN BLOOD BY AUTOMATED COUNT: 13.2 %
ERYTHROCYTE [DISTWIDTH] IN BLOOD BY AUTOMATED COUNT: 13.2 %
EST. AVERAGE GLUCOSE BLD GHB EST-MCNC: 114 MG/DL (ref 68–126)
FIO2: 100 %
FIO2: 100 %
FIO2: 70 %
FIO2: 70 %
FIO2: 75 %
FLUAV RNA SPEC QL NAA+PROBE: NOT DETECTED
FLUBV RNA SPEC QL NAA+PROBE: NOT DETECTED
GFR SERPLBLD BASED ON 1.73 SQ M-ARVRAT: 36 ML/MIN/1.73M2 (ref 60–?)
GFR SERPLBLD BASED ON 1.73 SQ M-ARVRAT: 41 ML/MIN/1.73M2 (ref 60–?)
GFR SERPLBLD BASED ON 1.73 SQ M-ARVRAT: 44 ML/MIN/1.73M2 (ref 60–?)
GFR SERPLBLD BASED ON 1.73 SQ M-ARVRAT: 45 ML/MIN/1.73M2 (ref 60–?)
GFR SERPLBLD BASED ON 1.73 SQ M-ARVRAT: 48 ML/MIN/1.73M2 (ref 60–?)
GFR SERPLBLD BASED ON 1.73 SQ M-ARVRAT: 51 ML/MIN/1.73M2 (ref 60–?)
GFR SERPLBLD BASED ON 1.73 SQ M-ARVRAT: 53 ML/MIN/1.73M2 (ref 60–?)
GLOBULIN PLAS-MCNC: 3.5 G/DL (ref 2.8–4.4)
GLUCOSE BLD-MCNC: 105 MG/DL (ref 70–99)
GLUCOSE BLD-MCNC: 107 MG/DL (ref 70–99)
GLUCOSE BLD-MCNC: 114 MG/DL (ref 70–99)
GLUCOSE BLD-MCNC: 128 MG/DL (ref 70–99)
GLUCOSE BLD-MCNC: 128 MG/DL (ref 70–99)
GLUCOSE BLD-MCNC: 132 MG/DL (ref 70–99)
GLUCOSE BLD-MCNC: 140 MG/DL (ref 70–99)
GLUCOSE BLD-MCNC: 143 MG/DL (ref 70–99)
GLUCOSE BLD-MCNC: 147 MG/DL (ref 70–99)
GLUCOSE BLD-MCNC: 149 MG/DL (ref 70–99)
GLUCOSE BLD-MCNC: 154 MG/DL (ref 70–99)
GLUCOSE BLD-MCNC: 154 MG/DL (ref 70–99)
GLUCOSE BLD-MCNC: 156 MG/DL (ref 70–99)
GLUCOSE BLD-MCNC: 158 MG/DL (ref 70–99)
GLUCOSE BLD-MCNC: 159 MG/DL (ref 70–99)
GLUCOSE BLD-MCNC: 165 MG/DL (ref 70–99)
GLUCOSE BLD-MCNC: 174 MG/DL (ref 70–99)
GLUCOSE BLD-MCNC: 178 MG/DL (ref 70–99)
GLUCOSE BLD-MCNC: 182 MG/DL (ref 70–99)
GLUCOSE BLD-MCNC: 197 MG/DL (ref 70–99)
HBA1C MFR BLD: 5.6 % (ref ?–5.7)
HCO3 BLD-SCNC: 22.5 MEQ/L
HCO3 BLDA-SCNC: 19.8 MEQ/L (ref 21–27)
HCO3 BLDA-SCNC: 20.8 MEQ/L (ref 21–27)
HCO3 BLDA-SCNC: 21.5 MEQ/L (ref 21–27)
HCO3 BLDA-SCNC: 23.2 MEQ/L (ref 21–27)
HCO3 BLDA-SCNC: 25.3 MEQ/L (ref 21–27)
HCT VFR BLD AUTO: 30.1 %
HCT VFR BLD AUTO: 30.6 %
HCT VFR BLD AUTO: 31.4 %
HCT VFR BLD AUTO: 32.2 %
HCT VFR BLD AUTO: 35.9 %
HCT VFR BLD AUTO: 41.5 %
HCT VFR BLD CALC: 33 %
HDLC SERPL-MCNC: 44 MG/DL (ref 40–59)
HGB BLD-MCNC: 10.2 G/DL
HGB BLD-MCNC: 10.2 G/DL
HGB BLD-MCNC: 10.4 G/DL
HGB BLD-MCNC: 10.4 G/DL
HGB BLD-MCNC: 10.6 G/DL
HGB BLD-MCNC: 10.9 G/DL
HGB BLD-MCNC: 11.3 G/DL
HGB BLD-MCNC: 11.5 G/DL
HGB BLD-MCNC: 11.5 G/DL
HGB BLD-MCNC: 12.5 G/DL
HGB BLD-MCNC: 14.3 G/DL
HGB RETIC QN AUTO: 35.3 PG (ref 28.2–36.6)
IMM GRANULOCYTES # BLD AUTO: 0.04 X10(3) UL (ref 0–1)
IMM GRANULOCYTES # BLD AUTO: 0.04 X10(3) UL (ref 0–1)
IMM GRANULOCYTES # BLD AUTO: 0.05 X10(3) UL (ref 0–1)
IMM GRANULOCYTES # BLD AUTO: 0.05 X10(3) UL (ref 0–1)
IMM GRANULOCYTES # BLD AUTO: 0.09 X10(3) UL (ref 0–1)
IMM GRANULOCYTES NFR BLD: 0.3 %
IMM GRANULOCYTES NFR BLD: 0.4 %
IMM GRANULOCYTES NFR BLD: 0.6 %
IMM RETICS NFR: 0.07 RATIO (ref 0.1–0.3)
INR BLD: 1 (ref 0.85–1.16)
IRON SATN MFR SERPL: 11 %
IRON SERPL-MCNC: 32 UG/DL
LACTATE BLD-SCNC: 1.1 MMOL/L (ref 0.5–2)
LACTATE BLD-SCNC: 1.9 MMOL/L (ref 0.5–2)
LDH SERPL L TO P-CCNC: 194 U/L
LDLC SERPL CALC-MCNC: 95 MG/DL (ref ?–100)
LYMPHOCYTES # BLD AUTO: 1.11 X10(3) UL (ref 1–4)
LYMPHOCYTES # BLD AUTO: 1.24 X10(3) UL (ref 1–4)
LYMPHOCYTES # BLD AUTO: 1.75 X10(3) UL (ref 1–4)
LYMPHOCYTES # BLD AUTO: 1.87 X10(3) UL (ref 1–4)
LYMPHOCYTES # BLD AUTO: 5.5 X10(3) UL (ref 1–4)
LYMPHOCYTES NFR BLD AUTO: 14.5 %
LYMPHOCYTES NFR BLD AUTO: 16.1 %
LYMPHOCYTES NFR BLD AUTO: 35.3 %
LYMPHOCYTES NFR BLD AUTO: 6.8 %
LYMPHOCYTES NFR BLD AUTO: 9.7 %
MAGNESIUM SERPL-MCNC: 2.2 MG/DL (ref 1.6–2.6)
MCH RBC QN AUTO: 30.8 PG (ref 26–34)
MCH RBC QN AUTO: 30.9 PG (ref 26–34)
MCH RBC QN AUTO: 31.1 PG (ref 26–34)
MCH RBC QN AUTO: 31.3 PG (ref 26–34)
MCH RBC QN AUTO: 31.3 PG (ref 26–34)
MCH RBC QN AUTO: 31.4 PG (ref 26–34)
MCHC RBC AUTO-ENTMCNC: 32.9 G/DL (ref 31–37)
MCHC RBC AUTO-ENTMCNC: 33.1 G/DL (ref 31–37)
MCHC RBC AUTO-ENTMCNC: 33.3 G/DL (ref 31–37)
MCHC RBC AUTO-ENTMCNC: 33.9 G/DL (ref 31–37)
MCHC RBC AUTO-ENTMCNC: 34.5 G/DL (ref 31–37)
MCHC RBC AUTO-ENTMCNC: 34.8 G/DL (ref 31–37)
MCV RBC AUTO: 89.8 FL
MCV RBC AUTO: 90.2 FL
MCV RBC AUTO: 92.3 FL
MCV RBC AUTO: 92.4 FL
MCV RBC AUTO: 93.2 FL
MCV RBC AUTO: 95.3 FL
METAPNEUMOVIRUS PCR:: NOT DETECTED
METHGB MFR BLD: 0 % SAT (ref 0.4–1.5)
MONOCYTES # BLD AUTO: 0.74 X10(3) UL (ref 0.1–1)
MONOCYTES # BLD AUTO: 0.98 X10(3) UL (ref 0.1–1)
MONOCYTES # BLD AUTO: 0.99 X10(3) UL (ref 0.1–1)
MONOCYTES # BLD AUTO: 1.03 X10(3) UL (ref 0.1–1)
MONOCYTES # BLD AUTO: 1.21 X10(3) UL (ref 0.1–1)
MONOCYTES NFR BLD AUTO: 4.5 %
MONOCYTES NFR BLD AUTO: 6.6 %
MONOCYTES NFR BLD AUTO: 8.2 %
MONOCYTES NFR BLD AUTO: 8.4 %
MONOCYTES NFR BLD AUTO: 9.5 %
MYCOPLASMA PNEUMONIA PCR:: NOT DETECTED
NEUTROPHILS # BLD AUTO: 10.19 X10 (3) UL (ref 1.5–7.7)
NEUTROPHILS # BLD AUTO: 10.19 X10(3) UL (ref 1.5–7.7)
NEUTROPHILS # BLD AUTO: 14.37 X10 (3) UL (ref 1.5–7.7)
NEUTROPHILS # BLD AUTO: 14.37 X10(3) UL (ref 1.5–7.7)
NEUTROPHILS # BLD AUTO: 8.38 X10 (3) UL (ref 1.5–7.7)
NEUTROPHILS # BLD AUTO: 8.38 X10(3) UL (ref 1.5–7.7)
NEUTROPHILS # BLD AUTO: 8.41 X10 (3) UL (ref 1.5–7.7)
NEUTROPHILS # BLD AUTO: 8.41 X10(3) UL (ref 1.5–7.7)
NEUTROPHILS # BLD AUTO: 9.17 X10 (3) UL (ref 1.5–7.7)
NEUTROPHILS # BLD AUTO: 9.17 X10(3) UL (ref 1.5–7.7)
NEUTROPHILS NFR BLD AUTO: 53.8 %
NEUTROPHILS NFR BLD AUTO: 72.3 %
NEUTROPHILS NFR BLD AUTO: 76 %
NEUTROPHILS NFR BLD AUTO: 80.1 %
NEUTROPHILS NFR BLD AUTO: 88 %
NON GYNE INTERPRETATION: NEGATIVE
NONHDLC SERPL-MCNC: 108 MG/DL (ref ?–130)
OSMOLALITY SERPL CALC.SUM OF ELEC: 296 MOSM/KG (ref 275–295)
OSMOLALITY SERPL CALC.SUM OF ELEC: 297 MOSM/KG (ref 275–295)
OSMOLALITY SERPL CALC.SUM OF ELEC: 301 MOSM/KG (ref 275–295)
OSMOLALITY SERPL CALC.SUM OF ELEC: 303 MOSM/KG (ref 275–295)
OSMOLALITY SERPL CALC.SUM OF ELEC: 305 MOSM/KG (ref 275–295)
OSMOLALITY SERPL CALC.SUM OF ELEC: 305 MOSM/KG (ref 275–295)
OSMOLALITY SERPL: 299 MOSM/KG (ref 280–300)
OSMOLALITY SERPL: 300 MOSM/KG (ref 280–300)
OSMOLALITY SERPL: 301 MOSM/KG (ref 280–300)
OSMOLALITY SERPL: 302 MOSM/KG (ref 280–300)
OSMOLALITY SERPL: 302 MOSM/KG (ref 280–300)
OSMOLALITY SERPL: 303 MOSM/KG (ref 280–300)
OSMOLALITY SERPL: 306 MOSM/KG (ref 280–300)
OXYHGB MFR BLDA: 88.7 % (ref 92–100)
OXYHGB MFR BLDA: 88.8 % (ref 92–100)
OXYHGB MFR BLDA: 90.4 % (ref 92–100)
OXYHGB MFR BLDA: 91 % (ref 92–100)
OXYHGB MFR BLDA: 96.1 % (ref 92–100)
P AXIS: 72 DEGREES
P-R INTERVAL: 190 MS
P/F RATIO: 84 MMHG
PARAINFLUENZA 1 PCR:: NOT DETECTED
PARAINFLUENZA 2 PCR:: NOT DETECTED
PARAINFLUENZA 3 PCR:: NOT DETECTED
PARAINFLUENZA 4 PCR:: NOT DETECTED
PCO2 BLD: 42.6 MMHG
PCO2 BLDA: 31 MM HG (ref 35–45)
PCO2 BLDA: 35 MM HG (ref 35–45)
PCO2 BLDA: 40 MM HG (ref 35–45)
PEEP: 5 CM H2O
PEEP: 5 CM H2O
PEEP: 7 CM H2O
PEEP: 8 CM H2O
PEEP: 8 CM H2O
PH BLD: 7.33 [PH]
PH BLDA: 7.3 [PH] (ref 7.35–7.45)
PH BLDA: 7.32 [PH] (ref 7.35–7.45)
PH BLDA: 7.37 [PH] (ref 7.35–7.45)
PH BLDA: 7.41 [PH] (ref 7.35–7.45)
PH BLDA: 7.45 [PH] (ref 7.35–7.45)
PHOSPHATE SERPL-MCNC: 3.3 MG/DL (ref 2.5–4.9)
PLATELET # BLD AUTO: 150 10(3)UL (ref 150–450)
PLATELET # BLD AUTO: 158 10(3)UL (ref 150–450)
PLATELET # BLD AUTO: 171 10(3)UL (ref 150–450)
PLATELET # BLD AUTO: 181 10(3)UL (ref 150–450)
PLATELET # BLD AUTO: 207 10(3)UL (ref 150–450)
PLATELET # BLD AUTO: 254 10(3)UL (ref 150–450)
PO2 BLD: 242 MMHG
PO2 BLDA: 57 MM HG (ref 80–100)
PO2 BLDA: 60 MM HG (ref 80–100)
PO2 BLDA: 65 MM HG (ref 80–100)
PO2 BLDA: 70 MM HG (ref 80–100)
PO2 BLDA: 84 MM HG (ref 80–100)
POTASSIUM BLD-SCNC: 3.6 MMOL/L (ref 3.6–5.1)
POTASSIUM BLD-SCNC: 4 MMOL/L (ref 3.6–5.1)
POTASSIUM BLD-SCNC: 4.8 MMOL/L (ref 3.6–5.1)
POTASSIUM SERPL-SCNC: 3.2 MMOL/L (ref 3.5–5.1)
POTASSIUM SERPL-SCNC: 3.4 MMOL/L (ref 3.5–5.1)
POTASSIUM SERPL-SCNC: 3.6 MMOL/L (ref 3.5–5.1)
POTASSIUM SERPL-SCNC: 4.2 MMOL/L (ref 3.5–5.1)
POTASSIUM SERPL-SCNC: 4.2 MMOL/L (ref 3.5–5.1)
POTASSIUM SERPL-SCNC: 4.4 MMOL/L (ref 3.5–5.1)
PROCALCITONIN SERPL-MCNC: 0.16 NG/ML (ref ?–0.16)
PROT SERPL-MCNC: 7.7 G/DL (ref 6.4–8.2)
PROTHROMBIN TIME: 13.2 SECONDS (ref 11.6–14.8)
Q-T INTERVAL: 398 MS
QRS DURATION: 92 MS
QTC CALCULATION (BEZET): 489 MS
R AXIS: 66 DEGREES
RBC # BLD AUTO: 3.26 X10(6)UL
RBC # BLD AUTO: 3.31 X10(6)UL
RBC # BLD AUTO: 3.37 X10(6)UL
RBC # BLD AUTO: 3.38 X10(6)UL
RBC # BLD AUTO: 4 X10(6)UL
RBC # BLD AUTO: 4.6 X10(6)UL
RETICS # AUTO: 46.5 X10(3) UL (ref 22.5–147.5)
RETICS/RBC NFR AUTO: 1.4 %
RH BLOOD TYPE: POSITIVE
RHINOVIRUS/ENTERO PCR:: NOT DETECTED
RSV RNA SPEC QL NAA+PROBE: NOT DETECTED
SAO2 % BLD: 100 %
SARS-COV-2 RNA NPH QL NAA+NON-PROBE: NOT DETECTED
SARS-COV-2 RNA RESP QL NAA+PROBE: NOT DETECTED
SODIUM BLD-SCNC: 141 MMOL/L (ref 136–145)
SODIUM BLD-SCNC: 142 MMOL/L (ref 135–145)
SODIUM BLD-SCNC: 142 MMOL/L (ref 135–145)
SODIUM SERPL-SCNC: 138 MMOL/L (ref 136–145)
SODIUM SERPL-SCNC: 139 MMOL/L (ref 136–145)
SODIUM SERPL-SCNC: 139 MMOL/L (ref 136–145)
SODIUM SERPL-SCNC: 140 MMOL/L (ref 136–145)
SODIUM SERPL-SCNC: 141 MMOL/L (ref 136–145)
SODIUM SERPL-SCNC: 141 MMOL/L (ref 136–145)
SODIUM SERPL-SCNC: 142 MMOL/L (ref 136–145)
SODIUM SERPL-SCNC: 143 MMOL/L (ref 136–145)
SODIUM SERPL-SCNC: 144 MMOL/L (ref 136–145)
SODIUM SERPL-SCNC: 145 MMOL/L (ref 136–145)
T AXIS: 81 DEGREES
TIBC SERPL-MCNC: 285 UG/DL (ref 240–450)
TIDAL VOLUME: 500 ML
TRANSFERRIN SERPL-MCNC: 191 MG/DL (ref 200–360)
TRIGL SERPL-MCNC: 65 MG/DL (ref 30–149)
VENT RATE: 14 /MIN
VENTRICULAR RATE: 91 BPM
VIT B12 SERPL-MCNC: 464 PG/ML (ref 193–986)
VLDLC SERPL CALC-MCNC: 11 MG/DL (ref 0–30)
WBC # BLD AUTO: 11.6 X10(3) UL (ref 4–11)
WBC # BLD AUTO: 12.1 X10(3) UL (ref 4–11)
WBC # BLD AUTO: 12.7 X10(3) UL (ref 4–11)
WBC # BLD AUTO: 13.1 X10(3) UL (ref 4–11)
WBC # BLD AUTO: 15.6 X10(3) UL (ref 4–11)
WBC # BLD AUTO: 16.3 X10(3) UL (ref 4–11)

## 2023-01-01 PROCEDURE — 70498 CT ANGIOGRAPHY NECK: CPT | Performed by: STUDENT IN AN ORGANIZED HEALTH CARE EDUCATION/TRAINING PROGRAM

## 2023-01-01 PROCEDURE — 99232 SBSQ HOSP IP/OBS MODERATE 35: CPT | Performed by: INTERNAL MEDICINE

## 2023-01-01 PROCEDURE — 82565 ASSAY OF CREATININE: CPT

## 2023-01-01 PROCEDURE — 71045 X-RAY EXAM CHEST 1 VIEW: CPT | Performed by: OTHER

## 2023-01-01 PROCEDURE — 93306 TTE W/DOPPLER COMPLETE: CPT | Performed by: INTERNAL MEDICINE

## 2023-01-01 PROCEDURE — B040ZZZ ULTRASONOGRAPHY OF BRAIN: ICD-10-PCS | Performed by: NEUROLOGICAL SURGERY

## 2023-01-01 PROCEDURE — 70450 CT HEAD/BRAIN W/O DYE: CPT | Performed by: PHYSICIAN ASSISTANT

## 2023-01-01 PROCEDURE — 99291 CRITICAL CARE FIRST HOUR: CPT | Performed by: OTHER

## 2023-01-01 PROCEDURE — 99291 CRITICAL CARE FIRST HOUR: CPT | Performed by: NURSE PRACTITIONER

## 2023-01-01 PROCEDURE — 0NU30JZ SUPPLEMENT RIGHT PARIETAL BONE WITH SYNTHETIC SUBSTITUTE, OPEN APPROACH: ICD-10-PCS | Performed by: NEUROLOGICAL SURGERY

## 2023-01-01 PROCEDURE — 72125 CT NECK SPINE W/O DYE: CPT | Performed by: STUDENT IN AN ORGANIZED HEALTH CARE EDUCATION/TRAINING PROGRAM

## 2023-01-01 PROCEDURE — 70450 CT HEAD/BRAIN W/O DYE: CPT | Performed by: NURSE PRACTITIONER

## 2023-01-01 PROCEDURE — 99223 1ST HOSP IP/OBS HIGH 75: CPT | Performed by: HOSPITALIST

## 2023-01-01 PROCEDURE — 5A1945Z RESPIRATORY VENTILATION, 24-96 CONSECUTIVE HOURS: ICD-10-PCS | Performed by: ANESTHESIOLOGY

## 2023-01-01 PROCEDURE — 4A133B1 MONITORING OF ARTERIAL PRESSURE, PERIPHERAL, PERCUTANEOUS APPROACH: ICD-10-PCS | Performed by: NURSE PRACTITIONER

## 2023-01-01 PROCEDURE — 03HY32Z INSERTION OF MONITORING DEVICE INTO UPPER ARTERY, PERCUTANEOUS APPROACH: ICD-10-PCS | Performed by: NURSE PRACTITIONER

## 2023-01-01 PROCEDURE — 70450 CT HEAD/BRAIN W/O DYE: CPT

## 2023-01-01 PROCEDURE — 99292 CRITICAL CARE ADDL 30 MIN: CPT | Performed by: OTHER

## 2023-01-01 PROCEDURE — 36620 INSERTION CATHETER ARTERY: CPT | Performed by: NURSE PRACTITIONER

## 2023-01-01 PROCEDURE — 99291 CRITICAL CARE FIRST HOUR: CPT | Performed by: NEUROLOGICAL SURGERY

## 2023-01-01 PROCEDURE — 4A133J1 MONITORING OF ARTERIAL PULSE, PERIPHERAL, PERCUTANEOUS APPROACH: ICD-10-PCS | Performed by: NURSE PRACTITIONER

## 2023-01-01 PROCEDURE — 00C70ZZ EXTIRPATION OF MATTER FROM CEREBRAL HEMISPHERE, OPEN APPROACH: ICD-10-PCS | Performed by: NEUROLOGICAL SURGERY

## 2023-01-01 PROCEDURE — 99024 POSTOP FOLLOW-UP VISIT: CPT | Performed by: PHYSICIAN ASSISTANT

## 2023-01-01 PROCEDURE — 76998 US GUIDE INTRAOP: CPT | Performed by: NEUROLOGICAL SURGERY

## 2023-01-01 PROCEDURE — 71045 X-RAY EXAM CHEST 1 VIEW: CPT | Performed by: INTERNAL MEDICINE

## 2023-01-01 PROCEDURE — 71260 CT THORAX DX C+: CPT | Performed by: RADIOLOGY

## 2023-01-01 PROCEDURE — 0BH17EZ INSERTION OF ENDOTRACHEAL AIRWAY INTO TRACHEA, VIA NATURAL OR ARTIFICIAL OPENING: ICD-10-PCS | Performed by: ANESTHESIOLOGY

## 2023-01-01 PROCEDURE — 71045 X-RAY EXAM CHEST 1 VIEW: CPT | Performed by: STUDENT IN AN ORGANIZED HEALTH CARE EDUCATION/TRAINING PROGRAM

## 2023-01-01 PROCEDURE — 70496 CT ANGIOGRAPHY HEAD: CPT | Performed by: STUDENT IN AN ORGANIZED HEALTH CARE EDUCATION/TRAINING PROGRAM

## 2023-01-01 PROCEDURE — 71045 X-RAY EXAM CHEST 1 VIEW: CPT | Performed by: NURSE PRACTITIONER

## 2023-01-01 PROCEDURE — 99233 SBSQ HOSP IP/OBS HIGH 50: CPT | Performed by: STUDENT IN AN ORGANIZED HEALTH CARE EDUCATION/TRAINING PROGRAM

## 2023-01-01 PROCEDURE — 99232 SBSQ HOSP IP/OBS MODERATE 35: CPT | Performed by: STUDENT IN AN ORGANIZED HEALTH CARE EDUCATION/TRAINING PROGRAM

## 2023-01-01 PROCEDURE — 99239 HOSP IP/OBS DSCHRG MGMT >30: CPT | Performed by: INTERNAL MEDICINE

## 2023-01-01 DEVICE — STRAIGHT PLATE, 12MM BAR, WITH TAB
Type: IMPLANTABLE DEVICE | Site: HEAD | Status: FUNCTIONAL
Brand: UNIVERSAL NEURO 3

## 2023-01-01 DEVICE — SCREW, AXS, SELF-DRILLING
Type: IMPLANTABLE DEVICE | Site: HEAD | Status: FUNCTIONAL
Brand: UNIVERSAL NEURO 3

## 2023-01-01 RX ORDER — ACETAMINOPHEN 325 MG/1
650 TABLET ORAL EVERY 4 HOURS PRN
Status: DISCONTINUED | OUTPATIENT
Start: 2023-01-01 | End: 2023-02-18

## 2023-01-01 RX ORDER — LABETALOL HYDROCHLORIDE 5 MG/ML
5 INJECTION, SOLUTION INTRAVENOUS EVERY 5 MIN PRN
Status: ACTIVE | OUTPATIENT
Start: 2023-01-01 | End: 2023-01-01

## 2023-01-01 RX ORDER — LABETALOL HYDROCHLORIDE 5 MG/ML
INJECTION, SOLUTION INTRAVENOUS
Status: DISPENSED
Start: 2023-01-01 | End: 2023-01-01

## 2023-01-01 RX ORDER — LABETALOL HYDROCHLORIDE 5 MG/ML
INJECTION, SOLUTION INTRAVENOUS
Status: COMPLETED
Start: 2023-01-01 | End: 2023-01-01

## 2023-01-01 RX ORDER — ONDANSETRON 2 MG/ML
4 INJECTION INTRAMUSCULAR; INTRAVENOUS ONCE
Status: COMPLETED | OUTPATIENT
Start: 2023-01-01 | End: 2023-01-01

## 2023-01-01 RX ORDER — HYDRALAZINE HYDROCHLORIDE 20 MG/ML
10 INJECTION INTRAMUSCULAR; INTRAVENOUS EVERY 2 HOUR PRN
Status: DISCONTINUED | OUTPATIENT
Start: 2023-01-01 | End: 2023-01-01

## 2023-01-01 RX ORDER — LABETALOL HYDROCHLORIDE 5 MG/ML
10 INJECTION, SOLUTION INTRAVENOUS EVERY 10 MIN PRN
Status: DISCONTINUED | OUTPATIENT
Start: 2023-01-01 | End: 2023-01-01

## 2023-01-01 RX ORDER — HALOPERIDOL 5 MG/ML
1 INJECTION INTRAMUSCULAR
Status: DISCONTINUED | OUTPATIENT
Start: 2023-01-01 | End: 2023-02-18

## 2023-01-01 RX ORDER — ONDANSETRON 2 MG/ML
INJECTION INTRAMUSCULAR; INTRAVENOUS
Status: DISPENSED
Start: 2023-01-01 | End: 2023-01-01

## 2023-01-01 RX ORDER — FUROSEMIDE 10 MG/ML
40 INJECTION INTRAMUSCULAR; INTRAVENOUS ONCE
Status: COMPLETED | OUTPATIENT
Start: 2023-01-01 | End: 2023-01-01

## 2023-01-01 RX ORDER — SODIUM CHLORIDE FOR INHALATION 3 %
3 VIAL, NEBULIZER (ML) INHALATION
Status: DISCONTINUED | OUTPATIENT
Start: 2023-01-01 | End: 2023-01-01

## 2023-01-01 RX ORDER — GLYCOPYRROLATE 0.2 MG/ML
0.4 INJECTION, SOLUTION INTRAMUSCULAR; INTRAVENOUS
Status: DISCONTINUED | OUTPATIENT
Start: 2023-01-01 | End: 2023-02-18

## 2023-01-01 RX ORDER — HYDROMORPHONE HYDROCHLORIDE 1 MG/ML
0.2 INJECTION, SOLUTION INTRAMUSCULAR; INTRAVENOUS; SUBCUTANEOUS EVERY 2 HOUR PRN
Status: DISCONTINUED | OUTPATIENT
Start: 2023-01-01 | End: 2023-01-01

## 2023-01-01 RX ORDER — ONDANSETRON 2 MG/ML
4 INJECTION INTRAMUSCULAR; INTRAVENOUS EVERY 6 HOURS PRN
Status: DISCONTINUED | OUTPATIENT
Start: 2023-01-01 | End: 2023-01-01

## 2023-01-01 RX ORDER — INSULIN ASPART 100 [IU]/ML
INJECTION, SOLUTION INTRAVENOUS; SUBCUTANEOUS ONCE
Status: DISCONTINUED | OUTPATIENT
Start: 2023-01-01 | End: 2023-01-01

## 2023-01-01 RX ORDER — HEPARIN SODIUM 5000 [USP'U]/ML
5000 INJECTION, SOLUTION INTRAVENOUS; SUBCUTANEOUS EVERY 8 HOURS SCHEDULED
Status: DISCONTINUED | OUTPATIENT
Start: 2023-01-01 | End: 2023-01-01

## 2023-01-01 RX ORDER — SODIUM PHOSPHATE, DIBASIC AND SODIUM PHOSPHATE, MONOBASIC 7; 19 G/133ML; G/133ML
1 ENEMA RECTAL ONCE AS NEEDED
Status: DISCONTINUED | OUTPATIENT
Start: 2023-01-01 | End: 2023-01-01

## 2023-01-01 RX ORDER — HYDROMORPHONE HYDROCHLORIDE 1 MG/ML
0.4 INJECTION, SOLUTION INTRAMUSCULAR; INTRAVENOUS; SUBCUTANEOUS EVERY 2 HOUR PRN
Status: DISCONTINUED | OUTPATIENT
Start: 2023-01-01 | End: 2023-01-01

## 2023-01-01 RX ORDER — LORAZEPAM 2 MG/ML
0.5 INJECTION INTRAMUSCULAR EVERY 4 HOURS PRN
Status: DISCONTINUED | OUTPATIENT
Start: 2023-01-01 | End: 2023-02-18

## 2023-01-01 RX ORDER — SCOLOPAMINE TRANSDERMAL SYSTEM 1 MG/1
1 PATCH, EXTENDED RELEASE TRANSDERMAL
Status: DISCONTINUED | OUTPATIENT
Start: 2023-01-01 | End: 2023-02-18

## 2023-01-01 RX ORDER — SODIUM PHOSPHATE, DIBASIC AND SODIUM PHOSPHATE, MONOBASIC 7; 19 G/133ML; G/133ML
1 ENEMA RECTAL ONCE AS NEEDED
Status: DISCONTINUED | OUTPATIENT
Start: 2023-01-01 | End: 2023-02-18

## 2023-01-01 RX ORDER — IPRATROPIUM BROMIDE AND ALBUTEROL SULFATE 2.5; .5 MG/3ML; MG/3ML
3 SOLUTION RESPIRATORY (INHALATION)
Status: DISCONTINUED | OUTPATIENT
Start: 2023-01-01 | End: 2023-01-01

## 2023-01-01 RX ORDER — SODIUM CHLORIDE, SODIUM LACTATE, POTASSIUM CHLORIDE, CALCIUM CHLORIDE 600; 310; 30; 20 MG/100ML; MG/100ML; MG/100ML; MG/100ML
INJECTION, SOLUTION INTRAVENOUS CONTINUOUS
Status: DISCONTINUED | OUTPATIENT
Start: 2023-01-01 | End: 2023-01-01

## 2023-01-01 RX ORDER — DIPHENHYDRAMINE HCL 25 MG
25 CAPSULE ORAL EVERY 4 HOURS PRN
Status: DISCONTINUED | OUTPATIENT
Start: 2023-01-01 | End: 2023-02-18

## 2023-01-01 RX ORDER — HYDROMORPHONE HYDROCHLORIDE 1 MG/ML
0.6 INJECTION, SOLUTION INTRAMUSCULAR; INTRAVENOUS; SUBCUTANEOUS EVERY 5 MIN PRN
Status: DISCONTINUED | OUTPATIENT
Start: 2023-01-01 | End: 2023-01-01

## 2023-01-01 RX ORDER — SODIUM CHLORIDE 9 MG/ML
INJECTION, SOLUTION INTRAVENOUS CONTINUOUS
Status: DISCONTINUED | OUTPATIENT
Start: 2023-01-01 | End: 2023-01-01

## 2023-01-01 RX ORDER — NICOTINE POLACRILEX 4 MG
15 LOZENGE BUCCAL
Status: DISCONTINUED | OUTPATIENT
Start: 2023-01-01 | End: 2023-01-01

## 2023-01-01 RX ORDER — PROCHLORPERAZINE 25 MG
25 SUPPOSITORY, RECTAL RECTAL EVERY 6 HOURS PRN
Status: DISCONTINUED | OUTPATIENT
Start: 2023-01-01 | End: 2023-02-18

## 2023-01-01 RX ORDER — BISACODYL 10 MG
10 SUPPOSITORY, RECTAL RECTAL
Status: DISCONTINUED | OUTPATIENT
Start: 2023-01-01 | End: 2023-01-01

## 2023-01-01 RX ORDER — DEXMEDETOMIDINE HYDROCHLORIDE 4 UG/ML
INJECTION, SOLUTION INTRAVENOUS CONTINUOUS
Status: DISCONTINUED | OUTPATIENT
Start: 2023-01-01 | End: 2023-01-01

## 2023-01-01 RX ORDER — LABETALOL HYDROCHLORIDE 5 MG/ML
20 INJECTION, SOLUTION INTRAVENOUS EVERY 10 MIN PRN
Status: DISCONTINUED | OUTPATIENT
Start: 2023-01-01 | End: 2023-01-01

## 2023-01-01 RX ORDER — SENNOSIDES 8.8 MG/5ML
5 LIQUID ORAL DAILY PRN
Status: DISCONTINUED | OUTPATIENT
Start: 2023-01-01 | End: 2023-01-01

## 2023-01-01 RX ORDER — HYDROMORPHONE HYDROCHLORIDE 1 MG/ML
INJECTION, SOLUTION INTRAMUSCULAR; INTRAVENOUS; SUBCUTANEOUS
Status: COMPLETED
Start: 2023-01-01 | End: 2023-01-01

## 2023-01-01 RX ORDER — DEXTROSE MONOHYDRATE 25 G/50ML
50 INJECTION, SOLUTION INTRAVENOUS
Status: DISCONTINUED | OUTPATIENT
Start: 2023-01-01 | End: 2023-01-01

## 2023-01-01 RX ORDER — SODIUM BICARBONATE 325 MG/1
325 TABLET ORAL AS NEEDED
Status: DISCONTINUED | OUTPATIENT
Start: 2023-01-01 | End: 2023-01-01

## 2023-01-01 RX ORDER — HALOPERIDOL 5 MG/ML
2 INJECTION INTRAMUSCULAR
Status: DISCONTINUED | OUTPATIENT
Start: 2023-01-01 | End: 2023-02-18

## 2023-01-01 RX ORDER — ACETAMINOPHEN 500 MG
500 TABLET ORAL EVERY 4 HOURS PRN
Status: DISCONTINUED | OUTPATIENT
Start: 2023-01-01 | End: 2023-01-01

## 2023-01-01 RX ORDER — BISACODYL 10 MG
10 SUPPOSITORY, RECTAL RECTAL
Status: DISCONTINUED | OUTPATIENT
Start: 2023-01-01 | End: 2023-02-18

## 2023-01-01 RX ORDER — HYDROMORPHONE HYDROCHLORIDE 1 MG/ML
0.8 INJECTION, SOLUTION INTRAMUSCULAR; INTRAVENOUS; SUBCUTANEOUS EVERY 2 HOUR PRN
Status: DISCONTINUED | OUTPATIENT
Start: 2023-01-01 | End: 2023-01-01

## 2023-01-01 RX ORDER — CHLORHEXIDINE GLUCONATE 0.12 MG/ML
15 RINSE ORAL 2 TIMES DAILY
Status: DISCONTINUED | OUTPATIENT
Start: 2023-01-01 | End: 2023-02-18

## 2023-01-01 RX ORDER — METOCLOPRAMIDE HYDROCHLORIDE 5 MG/ML
10 INJECTION INTRAMUSCULAR; INTRAVENOUS EVERY 8 HOURS PRN
Status: DISCONTINUED | OUTPATIENT
Start: 2023-01-01 | End: 2023-01-01

## 2023-01-01 RX ORDER — SENNOSIDES 8.6 MG
17.2 TABLET ORAL NIGHTLY PRN
Status: DISCONTINUED | OUTPATIENT
Start: 2023-01-01 | End: 2023-01-01

## 2023-01-01 RX ORDER — HYDROMORPHONE HYDROCHLORIDE 1 MG/ML
0.2 INJECTION, SOLUTION INTRAMUSCULAR; INTRAVENOUS; SUBCUTANEOUS EVERY 5 MIN PRN
Status: DISCONTINUED | OUTPATIENT
Start: 2023-01-01 | End: 2023-01-01

## 2023-01-01 RX ORDER — ATROPINE SULFATE 10 MG/ML
2 SOLUTION/ DROPS OPHTHALMIC EVERY 2 HOUR PRN
Status: DISCONTINUED | OUTPATIENT
Start: 2023-01-01 | End: 2023-02-18

## 2023-01-01 RX ORDER — PROCHLORPERAZINE MALEATE 10 MG
10 TABLET ORAL EVERY 4 HOURS PRN
Status: DISCONTINUED | OUTPATIENT
Start: 2023-01-01 | End: 2023-02-18

## 2023-01-01 RX ORDER — LEVETIRACETAM 100 MG/ML
500 SOLUTION ORAL 2 TIMES DAILY
Status: DISCONTINUED | OUTPATIENT
Start: 2023-01-01 | End: 2023-02-18

## 2023-01-01 RX ORDER — LORAZEPAM 2 MG/ML
2 INJECTION INTRAMUSCULAR EVERY 4 HOURS PRN
Status: DISCONTINUED | OUTPATIENT
Start: 2023-01-01 | End: 2023-02-18

## 2023-01-01 RX ORDER — LABETALOL HYDROCHLORIDE 5 MG/ML
10 INJECTION, SOLUTION INTRAVENOUS EVERY 10 MIN PRN
Status: COMPLETED | OUTPATIENT
Start: 2023-01-01 | End: 2023-01-01

## 2023-01-01 RX ORDER — SENNOSIDES 8.8 MG/5ML
5 LIQUID ORAL DAILY
Status: DISCONTINUED | OUTPATIENT
Start: 2023-01-01 | End: 2023-01-01

## 2023-01-01 RX ORDER — SENNOSIDES 8.6 MG
17.2 TABLET ORAL NIGHTLY PRN
Status: DISCONTINUED | OUTPATIENT
Start: 2023-01-01 | End: 2023-02-18

## 2023-01-01 RX ORDER — SODIUM CHLORIDE 9 MG/ML
1000 INJECTION, SOLUTION INTRAVENOUS ONCE
Status: COMPLETED | OUTPATIENT
Start: 2023-01-01 | End: 2023-01-01

## 2023-01-01 RX ORDER — LORAZEPAM 2 MG/ML
1 INJECTION INTRAMUSCULAR
Status: DISCONTINUED | OUTPATIENT
Start: 2023-01-01 | End: 2023-01-01

## 2023-01-01 RX ORDER — NICOTINE POLACRILEX 4 MG
30 LOZENGE BUCCAL
Status: DISCONTINUED | OUTPATIENT
Start: 2023-01-01 | End: 2023-01-01

## 2023-01-01 RX ORDER — LABETALOL HYDROCHLORIDE 5 MG/ML
10 INJECTION, SOLUTION INTRAVENOUS ONCE
Status: COMPLETED | OUTPATIENT
Start: 2023-01-01 | End: 2023-01-01

## 2023-01-01 RX ORDER — HYDROMORPHONE HYDROCHLORIDE 1 MG/ML
1 INJECTION, SOLUTION INTRAMUSCULAR; INTRAVENOUS; SUBCUTANEOUS ONCE
Status: COMPLETED | OUTPATIENT
Start: 2023-01-01 | End: 2023-01-01

## 2023-01-01 RX ORDER — MELATONIN
3 NIGHTLY PRN
Status: DISCONTINUED | OUTPATIENT
Start: 2023-01-01 | End: 2023-01-01

## 2023-01-01 RX ORDER — POLYETHYLENE GLYCOL 3350 17 G/17G
17 POWDER, FOR SOLUTION ORAL DAILY PRN
Status: DISCONTINUED | OUTPATIENT
Start: 2023-01-01 | End: 2023-02-18

## 2023-01-01 RX ORDER — LEVETIRACETAM 500 MG/5ML
500 INJECTION, SOLUTION, CONCENTRATE INTRAVENOUS ONCE
Status: COMPLETED | OUTPATIENT
Start: 2023-01-01 | End: 2023-01-01

## 2023-01-01 RX ORDER — HYDROCODONE BITARTRATE AND ACETAMINOPHEN 5; 325 MG/1; MG/1
1 TABLET ORAL EVERY 6 HOURS PRN
Status: DISCONTINUED | OUTPATIENT
Start: 2023-01-01 | End: 2023-01-01

## 2023-01-01 RX ORDER — LEVETIRACETAM 500 MG/5ML
500 INJECTION, SOLUTION, CONCENTRATE INTRAVENOUS EVERY 12 HOURS
Status: DISCONTINUED | OUTPATIENT
Start: 2023-01-01 | End: 2023-01-01

## 2023-01-01 RX ORDER — LORAZEPAM 2 MG/ML
1 INJECTION INTRAMUSCULAR EVERY 4 HOURS PRN
Status: DISCONTINUED | OUTPATIENT
Start: 2023-01-01 | End: 2023-02-18

## 2023-01-01 RX ORDER — ACETAMINOPHEN 650 MG/1
650 SUPPOSITORY RECTAL EVERY 4 HOURS PRN
Status: DISCONTINUED | OUTPATIENT
Start: 2023-01-01 | End: 2023-02-18

## 2023-01-01 RX ORDER — LIDOCAINE HYDROCHLORIDE AND EPINEPHRINE 10; 10 MG/ML; UG/ML
INJECTION, SOLUTION INFILTRATION; PERINEURAL AS NEEDED
Status: DISCONTINUED | OUTPATIENT
Start: 2023-01-01 | End: 2023-01-01 | Stop reason: HOSPADM

## 2023-01-01 RX ORDER — HYDROMORPHONE HYDROCHLORIDE 1 MG/ML
0.4 INJECTION, SOLUTION INTRAMUSCULAR; INTRAVENOUS; SUBCUTANEOUS EVERY 5 MIN PRN
Status: DISCONTINUED | OUTPATIENT
Start: 2023-01-01 | End: 2023-01-01

## 2023-01-01 RX ORDER — POLYETHYLENE GLYCOL 3350 17 G/17G
17 POWDER, FOR SOLUTION ORAL DAILY PRN
Status: DISCONTINUED | OUTPATIENT
Start: 2023-01-01 | End: 2023-01-01

## 2023-01-01 RX ORDER — NALOXONE HYDROCHLORIDE 0.4 MG/ML
80 INJECTION, SOLUTION INTRAMUSCULAR; INTRAVENOUS; SUBCUTANEOUS AS NEEDED
Status: ACTIVE | OUTPATIENT
Start: 2023-01-01 | End: 2023-01-01

## 2023-02-09 ENCOUNTER — HOSPITAL ENCOUNTER (OUTPATIENT)
Dept: CT IMAGING | Facility: HOSPITAL | Age: 78
Discharge: HOME | End: 2023-02-09
Attending: RADIOLOGY
Payer: MEDICARE

## 2023-02-10 ENCOUNTER — TELEPHONE (OUTPATIENT)
Dept: RADIATION ONCOLOGY | Facility: HOSPITAL | Age: 78
End: 2023-02-10

## 2023-02-10 DIAGNOSIS — C34.31 PRIMARY MALIGNANT NEOPLASM OF RIGHT LOWER LOBE OF LUNG (HCC): Primary | ICD-10-CM

## 2023-02-10 NOTE — TELEPHONE ENCOUNTER
TC to patient    CT looks good  Now almost 2 years out from SBRT to proximal R lung mass    Order placed for CT chest in August 2023 - no contrast this time    Creatinine was almost 2 during routine check before this recent CT  He has not had recent illness  Does not have a nephrologist    Advised to contact PCP to work this up

## 2023-02-12 PROBLEM — I62.9 INTRACRANIAL HEMORRHAGE (HCC): Status: ACTIVE | Noted: 2023-01-01

## 2023-02-12 PROBLEM — I62.9 INTRACRANIAL HEMORRHAGE (HCC): Status: ACTIVE | Noted: 2023-02-12

## 2023-02-13 ENCOUNTER — ANESTHESIA EVENT (OUTPATIENT)
Dept: SURGERY | Facility: HOSPITAL | Age: 78
End: 2023-02-13
Payer: MEDICARE

## 2023-02-13 ENCOUNTER — ANESTHESIA (OUTPATIENT)
Dept: SURGERY | Facility: HOSPITAL | Age: 78
End: 2023-02-13
Payer: MEDICARE

## 2023-02-13 RX ORDER — CEFAZOLIN SODIUM 1 G/3ML
INJECTION, POWDER, FOR SOLUTION INTRAMUSCULAR; INTRAVENOUS AS NEEDED
Status: DISCONTINUED | OUTPATIENT
Start: 2023-02-13 | End: 2023-02-13 | Stop reason: SURG

## 2023-02-13 RX ORDER — ROCURONIUM BROMIDE 10 MG/ML
INJECTION, SOLUTION INTRAVENOUS AS NEEDED
Status: DISCONTINUED | OUTPATIENT
Start: 2023-02-13 | End: 2023-02-13 | Stop reason: SURG

## 2023-02-13 RX ADMIN — ROCURONIUM BROMIDE 20 MG: 10 INJECTION, SOLUTION INTRAVENOUS at 13:42:00

## 2023-02-13 RX ADMIN — SODIUM CHLORIDE: 9 INJECTION, SOLUTION INTRAVENOUS at 13:22:00

## 2023-02-13 RX ADMIN — ROCURONIUM BROMIDE 20 MG: 10 INJECTION, SOLUTION INTRAVENOUS at 14:41:00

## 2023-02-13 RX ADMIN — SODIUM CHLORIDE: 9 INJECTION, SOLUTION INTRAVENOUS at 15:28:00

## 2023-02-13 RX ADMIN — ROCURONIUM BROMIDE 20 MG: 10 INJECTION, SOLUTION INTRAVENOUS at 14:10:00

## 2023-02-13 RX ADMIN — CEFAZOLIN SODIUM 2 G: 1 INJECTION, POWDER, FOR SOLUTION INTRAMUSCULAR; INTRAVENOUS at 13:35:00

## 2023-02-13 NOTE — PROGRESS NOTES
Neuro assessment completed after getting BP under control. Pt awake, alert, follows commands and answer questions appropriately    2. Best gaze (2) R gaze  3.visual field (2) left field cut  4.  L facial droop (2)  5L L arm (4)  6L L leg (4)  8 sensory (2) no sensation to Left  9 unable to describe picture (1) able to speak and understand  10 Dysarthria (2)   11 extinction ( 2) vision and sensation deficits on Left    NIHSS= 21    1945: Jaz Mistry

## 2023-02-13 NOTE — IMAGING NOTE
Neurosurgery    Discussed with Dr. Genevieve Klinefelter in ED. Pt is awake and alert, but plegic on left. Stroke activation. CT reviewed. Large right parietal/occipital IPH with some mass effect and mild shift. Vascular prominence noted on CTA. Reviewed with Dr. Laurie Paris NP, and one of my partners. Ddx includes amyloid, hemorrhagic tumor, vascular lesion, others. CT shows hemorrhage is diffusely intermixed with brain parenchyma. Therefore, surgical resection would likely involve some damage to the surrounding brain, and would not likely lead to rapid recovery of left sided motor function. Admit CNICU  Arterial line  -140  Hypertonic Na goal 145-150  No indication for emergent surgical intervention. q1h neuro checks.    Repeat CT in AM

## 2023-02-13 NOTE — PROCEDURES
Arterial Line  Performed by: Makayla GONSALES     General Information and Staff     Procedure Start: 3094  Patient Location:  ICU  Indication: continuous blood pressure monitoring and blood sampling needed    Site Identification: real time ultrasound guided, sterile technique used     Procedure Details     Catheter Size:  20 G  Catheter Length:  1 and 3/4 inchCatheter Type:  Arrow  Seldinger Technique?: Yes    Laterality:  Left  Site: Radial    Site Prep: chlorhexidine  Line Secured:  Tape and Tegaderm     Assessment: Eric's test negative, Good flash, guidewire and catheter advanced without difficulty, pulsatile blood flow noted.     Events: patient tolerated procedure well with no complications

## 2023-02-13 NOTE — PLAN OF CARE
Pt alert and oriented x 4 following commands. Left sided neglect and hemianopia. See flowsheets for further neuro assessment. Q1 neuro assessments, Q6 Mannitol. -140. NSR/ST on monitor occasional PVCs. Cusick inserted by Night shift APN. AM CT showed worsening midline shift and bleed. Nuero still intact but pt more lethargic. Emesis with possible aspiration Nightshift APN aware if worsening breathing or sats re notify.   q1 with pupilometer

## 2023-02-13 NOTE — ANESTHESIA POSTPROCEDURE EVALUATION
[address] Patient Status:  Inpatient   Age/Gender 69year old male MRN [id number]   [facility] 6NE-A Attending Dano Barrow 91 Day # 1 PCP Mason Tolbert MD       Anesthesia Post-op Note    RIGHT PARIETAL CRANIOTOMY FOR EVACUATION OF HEMORRHAGE    Procedure Summary     Date: 02/13/23 Room / Location: [facility] MAIN OR  / [facility] MAIN OR    Anesthesia Start: 8277 Anesthesia Stop: 3697    Procedure: RIGHT PARIETAL CRANIOTOMY FOR EVACUATION OF HEMORRHAGE (Right: Head) Diagnosis:       Hemorrhage      (Hemorrhage [R58])    Surgeons: Eddie Atwood MD Anesthesiologist: Richi Baig MD    Anesthesia Type: general ASA Status: 4 - Emergent          Anesthesia Type: general    Vitals Value Taken Time   /70 02/13/23 1528   Temp 98.1 02/13/23 1528   Pulse 98 02/13/23 1528   Resp 14 02/13/23 1528   SpO2 96 02/13/23 1528   Vitals shown include unvalidated device data. Patient Location: ICU    Anesthesia Type: general    Airway Patency: intubated    Postop Pain Control: adequate    Mental Status: sedated until time of extubation    Nausea/Vomiting: none    Cardiopulmonary/Hydration status: stable euvolemic    Complications: no apparent anesthesia related complications    Postop vital signs: stable    Dental Exam: Unchanged from Preop    Sign out given to ICU staff.

## 2023-02-13 NOTE — SLP NOTE
Order received, spoke with RN who reports patient NPO for procedure later today. Will hold and check status 2/14/23.     Lis Braga Avel 87 CCC-SLP  Pager [phone]

## 2023-02-13 NOTE — PLAN OF CARE
Assumed patient care at 0730. Patient resting in bed lethargic, but able to arouse. Neuros Q1- moves left side to pain only, slurred speech, left facial droop. See flow sheets for full assessment. Nasal cannula on to keep SpO2 > 94%. Monitor shows sinus rhythm. SBP goal 100-150, cardene gtt infusing to maintain. NPO. External urinary pouch in place. Denies pain. Plan for craniotomy this afternoon. Consent signed, placed in chart. Received patient from OR at 1545. Patient intubated, sedated on propofol gtt. No change in neuro exam. Patient coughing/fighting vent, orders to start precedex gtt. Surgical drains x2. Family at bedside, updated on plan of care.

## 2023-02-13 NOTE — SIGNIFICANT EVENT
Called to B3 at 1844 for Code Stroke. Met patient in the launch pad at [address] [place] [address]. Per medics at 2233 patient had sudden onset of left sided weakness, lightheadedness and headache. He fell but did not hit his head. He arrives in a c-collar. Upon quick assessment left side appears completely flaccid and he has slurred speech. He is able to answer questions appropriately and follow commands. Pre-MRS 1  Accompanied patient to CT scanner at 1852. Discussed case with Dr. Juaquin Apley, neurologist on call, at 8003. Discussed case with Dr. Dorys Rodriguez, neurointerventionalist on call, at 2246. 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 Dr. Juaquin Apley and Dr. Dorys Rodriguez updated on findings. CTA completed after clarifying with both MD's that it was still needed. Post CT scan patient began projectile vomiting enroute to ED room. Amy night shift navigator met us on our way back to ED. Report was given to her and she is taking over. Dr. Dorys Rodriguez and Dr. Juaquin Apley made aware.

## 2023-02-13 NOTE — ED INITIAL ASSESSMENT (HPI)
Patient arrives after sudden onset headache 15 minutes PTA. Patient rolled out of bed, wife called 911 immediately.  Left sided defiity on arrival.

## 2023-02-13 NOTE — ANESTHESIA PROCEDURE NOTES
Airway  Date/Time: 2/13/2023 1:31 PM  Urgency: elective    Airway not difficult    General Information and Staff    Patient location during procedure: OR  Anesthesiologist: Nicol Jackson MD  Performed: anesthesiologist     Indications and Patient Condition  Indications for airway management: anesthesia  Sedation level: deep  Preoxygenated: yes  Patient position: sniffing  Mask difficulty assessment: 1 - vent by mask    Final Airway Details  Final airway type: endotracheal airway      Successful airway: ETT  Cuffed: yes   Successful intubation technique: direct laryngoscopy  Endotracheal tube insertion site: oral  Blade: GlideScope  Blade size: #4  ETT size (mm): 7.5    Cormack-Lehane Classification: grade I - full view of glottis  Placement verified by: chest auscultation and capnometry   Measured from: teeth  ETT to teeth (cm): 22  Number of attempts at approach: 1

## 2023-02-13 NOTE — BRIEF OP NOTE
Pre-Operative Diagnosis: Hemorrhage [R58]     Post-Operative Diagnosis: Hemorrhage [R58]      Procedure Performed:   RIGHT PARIETAL CRANIOTOMY FOR EVACUATION OF HEMORRHAGE    Surgeon(s) and Role:     Deyanira Ocampo MD - Primary    Assistant(s):  PA: Lauren Jerry     Surgical Findings: Clot     Specimen: Clot     Estimated Blood Loss: 100 ml      Anderson Collet, PA  2/13/2023  3:19 PM

## 2023-02-13 NOTE — PHYSICAL THERAPY NOTE
PT eval orders received, chart reviewed. NEIL Weiss reports pt to have surgical procedure today, not appropriate for PT at this time. Will continue to follow.

## 2023-02-14 NOTE — OPERATIVE REPORT
Neurosurgery operative report        Preoperative diagnosis:  Right parieto-occipital intraparenchymal hemorrhage with brain compression and shift, altered mental status      Postoperative diagnosis:  Same      Procedure performed:  1. Right parietal craniotomy for evacuation of intraparenchymal hemorrhage  2. Use of intraoperative ultrasound with live interpretation of intraoperative imaging          Surgeon: Mirian Koch MD        Assistant: Tatyana Casper PA-C        Anesthesia: General endotracheal        Estimated blood loss: 100 cc        Indications for procedure:    Please also see the relevant progress notes for further information. Briefly, this patient was admitted to the hospital on the evening prior to this operation. He had evidence of a large right parieto-occipital intraparenchymal hemorrhage, with left-sided plegia. However, the patient was awake and alert. He was managed medically, including mannitol. On the morning of this operation, the patient was progressively less responsive. He became obtunded. I spoke with the patient and his wife regarding available surgical and nonsurgical treatment options. I recommended emergent decompressive craniotomy with intraparenchymal hemorrhage evacuation. Risks and benefits of this operation were reviewed. They chose to proceed with this operation on an emergent, inpatient basis. Procedure in detail:    The patient was reevaluated in the preoperative holding area. The patient was reexamined. The operation was reviewed, including risks, benefits, and alternatives. Informed consent was verified. The patient was then brought back to the operating theater. A timeout was performed per protocol. General endotracheal anesthesia was induced. Lines and monitors were started by the anesthesiologist.    The patient was placed in a supine position on a standard operating table. A roll was placed beneath the right shoulder.   The head was rotated to the left. Hair was removed with surgical clippers. The scalp was cleansed with isopropyl alcohol. Midline was marked out, as was the skin incision and the intended craniotomy. DuraPrep was applied followed by standard sterile surgical draping. Lidocaine with epinephrine was injected. Another timeout was performed per protocol. Skin incision was made with a #10 scalpel blade. Kimberly clips were applied. A subgaleal dissection was carried out sharply. A self-retaining retractor was placed. Paracranium was incised with Bovie electrocautery on cutting current. Bur holes were placed along the medial aspect of the craniotomy. A craniotome was used to fashion a craniotomy flap. The bone flap was elevated and placed on the back table for later reimplantation. Ultrasound was now brought onto the field. This was used to confirm the location of the intraparenchymal hemorrhage. Cruciform dural opening was carried out with a #15 scalpel blade and Metzenbaum scissors. Dural leaflets were reflected with 4-0 Nurolon stitches. The brain was found to be under substantially elevated pressure. Ultrasound was used once again to confirm the location of the hemorrhage. A corticotomy was carried out with bipolar electrocautery, 15 blade, and microscissors. At a depth of approximately 8 mm, a large intraparenchymal hemorrhage was encountered. The first portion of this was liquid, with some suspended blood products. This was expressed under high pressure. This was evacuated further with suction. This led to very good decompression of the brain. The hemorrhage cavity was then explored further. Clotted blood was evacuated with suction and irrigation. Because of the admixture of blood products and white matter, no attempt was made to resect the entirety of the hemorrhage. However, at the conclusion of this process, the brain was found to be substantially decompressed.     The incision was irrigated copiously with saline solution. A ventriculostomy catheter was placed in the hemorrhage cavity. Dura was reapproximated with interrupted 4-0 Nurolon sutures. The bone flap was prefabricated to the back table. This was reaffixed, and the ventriculostomy catheter was brought out through one of the bur holes. This was brought out through a separate stab incision. Hemovac drain was placed in the subgaleal space and brought out through another stab incision. The incision was irrigated once again. Kimberly clips were removed, and galea was closed with interrupted inverted 2-0 Vicryl's. Skin was closed with staples. The drains were sutured in position. A dressing was applied. The drapes were removed. The patient was now returned to a neutral, supine position. Disposition: Neurosurgical intensive care unit    Condition: Critical, intubated, and emerging from anesthesia. On examination in ICU postoperatively, the patient was at his preoperative baseline. He was arousable to voice and mechanical stimulation, and following commands on the right. Counts: All needle, sponge, and cottonoid counts were reported correct at the end of the operation. Complications: None    Wound classification: 1, clean    Implants: [place]akes[place] cranial plating system    Specimen: Intraparenchymal hemorrhage to pathology for permanent sections. This report was dictated using voice recognition technology. Errors in syntax or recognition may occur, and should not be construed to change the meaning of a sentence.

## 2023-02-14 NOTE — PROGRESS NOTES
Assumed care of pt at 1. Pt vented and sedated. Propofol and precedex. Q1 neuros with pupilometer. Withdraws from deep pain all extremeties 1/5. S/p crainiotomy. 1 drain to gravity one drain to bulb suction. Taylor. [place]. SBP within parameter.

## 2023-02-14 NOTE — SLP NOTE
Note patient remains orally intubated post procedure. Will hold and follow peripherally, completing evaluation as clinically appropriate.     Casey Braga Avel 88 CCC-SLP  Pager [phone]

## 2023-02-15 NOTE — PLAN OF CARE
Assumed patient care this morning. Intubated. Sedated with propofol/precedex. At shift change patient with tachypnea/desaturating/not compliant with vent, twitching to right thigh area, opened eyes once with upperward gaze. Dilaudid given with no change. Dr. Tequila Palma came to bedside to assess, felt to be respiratory problems. Ev DURBIN at bedside. Extra dose of dilaudid given. Patient then improved with above symptoms. Able to withdraw to pain on right side and at times moves right arm spontaneously. No withdrawal to pain on left side. Discussed with neuro and neurosurgery PA. Neurosurgey PA requested sedation to be stopped this am to assess neuro status, once off sedation for about 12mins patient able to squeeze right hand and wiggle right toes to commands, but then became agitated and back on sedation. Increasing oxygen needs throughout the day. Per neuro, would like with RASS goal of -1, however with decrease sedation patient does not tolerate vent, asynchronous. Spouse at bedside, updated.

## 2023-02-15 NOTE — PROGRESS NOTES
Assumed care of pt vented and sedated propofol and precedex. Q1 Neuro assessments with pupilometer. Withdraws all extremities from deep pain. Occasionally waking up or not tolerating ventilator requiring periods of increased sedation. SBP within parameters. cardene gtt off. NSR on monitor occasional PVCs. Crainiotomy drains one to bulb suction one to gravity. A line. Vazquez.

## 2023-02-15 NOTE — PHYSICAL THERAPY NOTE
Pt is a 68 y/o M who presented to the ED on 2/12/23 after falling out of bed with severe HA and slurred speech. CT of the head demonstrated large acute intraparenchymal hematoma in the right hemisphere with surrounding vasogenic edema, mass effect and midline shift. Pt underwent urgent right parietal craniotomy for evacuation of IPH on 2/13/23. Pt remains intubated and sedated at this time. Will hold physical evaluation as pt is unable to follow commands or participate in therapy given sedation. Will continue to follow peripherally and initiate physical therapy evaluation as clinically appropriate.

## 2023-02-16 NOTE — PROGRESS NOTES
[facility] Pharmacy Note: Route Optimization     Patient is currently on Levetiracetam (Keppra) 500 mg IV every 12 hours and pantoprazole (Protonix) 40 mg IV every 24 hours. The patient meets the criteria to convert to the oral equivalent as established by the IV to Oral conversion protocol approved by the P&T committee. Medication was changed from IV formulation to Levetiracetam (Keppra) 500 mg PO every 12 hours and to omeprazole 20 mg PO daily per protocol.     Sanjay Francois, PharmD  2/16/2023,  1:27 PM

## 2023-02-16 NOTE — PLAN OF CARE
Assumed patient care at [address] [place] [address]. When sedation lowered, respirations/HR/BP increased. Patient able to follow commands on right upper and lower extremities, withdraws from painful stimuli on left upper/lower extremities while awake. Large thick secretions suctioned from ETT overnight, patient sedated RASS -4 prior to suctioning, patient became agitated RASS +3 not tolerating suction/vent but able to follow commands on right side. Vent settings adjusted overnight per pulm. Bilateral wrist restraints in place, will discontinue when clinically justified. Updated family on plan of care and patient status. No further concerns, RN to monitor. Problem: Patient/Family Goals  Goal: Patient/Family Long Term Goal  Description: Patient's Long Term Goal: Extubate    Interventions:  - Keep calm intubated, adjust sedation settings  - See additional Care Plan goals for specific interventions  Outcome: Progressing  Goal: Patient/Family Short Term Goal  Description: Patient's Short Term Goal: Rest comfortably    Interventions:   - sedation titration, turns  - See additional Care Plan goals for specific interventions  Outcome: Progressing     Problem: NEUROLOGICAL - ADULT  Goal: Achieves stable or improved neurological status  Description: INTERVENTIONS  - Assess for and report changes in neurological status  - Initiate measures to prevent increased intracranial pressure  - Maintain blood pressure and fluid volume within ordered parameters to optimize cerebral perfusion and minimize risk of hemorrhage  - Monitor temperature, glucose, and sodium.  Initiate appropriate interventions as ordered  Outcome: Progressing  Goal: Remains free of injury related to seizure activity  Description: INTERVENTIONS:  - Maintain airway, patient safety  and administer oxygen as ordered  - Monitor patient for seizure activity, document and report duration and description of seizure to MD/LIP  - If seizure occurs, turn patient to side and suction secretions as needed  - Reorient patient post seizure  - Seizure pads on all 4 side rails  - Instruct patient/family to notify RN of any seizure activity  - Instruct patient/family to call for assistance with activity based on assessment  Outcome: Progressing  Goal: Achieves maximal functionality and self care  Description: INTERVENTIONS  - Monitor swallowing and airway patency with patient fatigue and changes in neurological status  - Encourage and assist patient to increase activity and self care with guidance from PT/OT  - Encourage visually impaired, hearing impaired and aphasic patients to use assistive/communication devices  Outcome: Progressing     Problem: Safety Risk - Non-Violent Restraints  Goal: Patient will remain free from self-harm  Description: INTERVENTIONS:  - Apply the least restrictive restraint to prevent harm  - Notify patient and family of reasons restraints applied  - Assess for any contributing factors to confusion (electrolyte disturbances, delirium, medications)  - Discontinue any unnecessary medical devices as soon as possible  - Assess the patient's physical comfort, circulation, skin condition, hydration, nutrition and elimination needs   - Reorient and redirection as needed  - Assess for the need to continue restraints  Outcome: Progressing     Problem: Delirium  Goal: Minimize duration of delirium  Description: Interventions:  - Encourage use of hearing aids, eye glasses  - Promote highest level of mobility daily  - Provide frequent reorientation  - Promote wakefulness i.e. lights on, blinds open  - Promote sleep, encourage patient's normal rest cycle i.e. lights off, TV off, minimize noise and interruptions  - Encourage family to assist in orientation and promotion of home routines  Outcome: Progressing     Problem: PAIN - ADULT  Goal: Verbalizes/displays adequate comfort level or patient's stated pain goal  Description: INTERVENTIONS:  - Encourage pt to monitor pain and request assistance  - Assess pain using appropriate pain scale  - Administer analgesics based on type and severity of pain and evaluate response  - Implement non-pharmacological measures as appropriate and evaluate response  - Consider cultural and social influences on pain and pain management  - Manage/alleviate anxiety  - Utilize distraction and/or relaxation techniques  - Monitor for opioid side effects  - Notify MD/LIP if interventions unsuccessful or patient reports new pain  - Anticipate increased pain with activity and pre-medicate as appropriate  Outcome: Progressing     Problem: RISK FOR INFECTION - ADULT  Goal: Absence of fever/infection during anticipated neutropenic period  Description: INTERVENTIONS  - Monitor WBC  - Administer growth factors as ordered  - Implement neutropenic guidelines  Outcome: Progressing     Problem: SAFETY ADULT - FALL  Goal: Free from fall injury  Description: INTERVENTIONS:  - Assess pt frequently for physical needs  - Identify cognitive and physical deficits and behaviors that affect risk of falls.   - [place] fall precautions as indicated by assessment.  - Educate pt/family on patient safety including physical limitations  - Instruct pt to call for assistance with activity based on assessment  - Modify environment to reduce risk of injury  - Provide assistive devices as appropriate  - Consider OT/PT consult to assist with strengthening/mobility  - Encourage toileting schedule  Outcome: Progressing     Problem: DISCHARGE PLANNING  Goal: Discharge to home or other facility with appropriate resources  Description: INTERVENTIONS:  - Identify barriers to discharge w/pt and caregiver  - Include patient/family/discharge partner in discharge planning  - Arrange for needed discharge resources and transportation as appropriate  - Identify discharge learning needs (meds, wound care, etc)  - Arrange for interpreters to assist at discharge as needed  - Consider post-discharge preferences of patient/family/discharge partner  - Complete POLST form as appropriate  - Assess patient's ability to be responsible for managing their own health  - Refer to Case Management Department for coordinating discharge planning if the patient needs post-hospital services based on physician/LIP order or complex needs related to functional status, cognitive ability or social support system  Outcome: Progressing     Problem: Altered Communication/Language Barrier  Goal: Patient/Family is able to understand and participate in their care  Description: Interventions:  - Assess communication ability and preferred communication style  - Implement communication aides and strategies  - Use visual cues when possible  - Listen attentively, be patient, do not interrupt  - Minimize distractions  - Allow time for understanding and response  - Establish method for patient to ask for assistance (call light)  - Provide an  as needed  - Communicate barriers and strategies to overcome with those who interact with patient  Outcome: Progressing

## 2023-02-16 NOTE — PLAN OF CARE
Assumed care at 0700. Neuro q1h with pupilometer. Patient coughing with movement and having increased work of breathing, MDs notified, no new orders. Respiratory to bedside for frequent lavage and suctioning. Minimal increase in oxygen saturation. Precedex gtt stopped. Propofol and cardene gtt infusing, frequent fentanyl pushes. Tube feeds infusing and tolerating. Please see Epic flowsheet for more detail. Problem: Patient/Family Goals  Goal: Patient/Family Long Term Goal  Description: Patient's Long Term Goal: Extubate    Interventions:  - Keep calm intubated, adjust sedation settings  - See additional Care Plan goals for specific interventions  Outcome: Progressing  Goal: Patient/Family Short Term Goal  Description: Patient's Short Term Goal: Rest comfortably    Interventions:   - sedation titration, turns  - See additional Care Plan goals for specific interventions  Outcome: Progressing     Problem: NEUROLOGICAL - ADULT  Goal: Achieves stable or improved neurological status  Description: INTERVENTIONS  - Assess for and report changes in neurological status  - Initiate measures to prevent increased intracranial pressure  - Maintain blood pressure and fluid volume within ordered parameters to optimize cerebral perfusion and minimize risk of hemorrhage  - Monitor temperature, glucose, and sodium.  Initiate appropriate interventions as ordered  Outcome: Progressing  Goal: Remains free of injury related to seizure activity  Description: INTERVENTIONS:  - Maintain airway, patient safety  and administer oxygen as ordered  - Monitor patient for seizure activity, document and report duration and description of seizure to MD/LIP  - If seizure occurs, turn patient to side and suction secretions as needed  - Reorient patient post seizure  - Seizure pads on all 4 side rails  - Instruct patient/family to notify RN of any seizure activity  - Instruct patient/family to call for assistance with activity based on assessment  Outcome: Progressing  Goal: Achieves maximal functionality and self care  Description: INTERVENTIONS  - Monitor swallowing and airway patency with patient fatigue and changes in neurological status  - Encourage and assist patient to increase activity and self care with guidance from PT/OT  - Encourage visually impaired, hearing impaired and aphasic patients to use assistive/communication devices  Outcome: Progressing     Problem: Safety Risk - Non-Violent Restraints  Goal: Patient will remain free from self-harm  Description: INTERVENTIONS:  - Apply the least restrictive restraint to prevent harm  - Notify patient and family of reasons restraints applied  - Assess for any contributing factors to confusion (electrolyte disturbances, delirium, medications)  - Discontinue any unnecessary medical devices as soon as possible  - Assess the patient's physical comfort, circulation, skin condition, hydration, nutrition and elimination needs   - Reorient and redirection as needed  - Assess for the need to continue restraints  Outcome: Progressing     Problem: Delirium  Goal: Minimize duration of delirium  Description: Interventions:  - Encourage use of hearing aids, eye glasses  - Promote highest level of mobility daily  - Provide frequent reorientation  - Promote wakefulness i.e. lights on, blinds open  - Promote sleep, encourage patient's normal rest cycle i.e. lights off, TV off, minimize noise and interruptions  - Encourage family to assist in orientation and promotion of home routines  Outcome: Progressing     Problem: PAIN - ADULT  Goal: Verbalizes/displays adequate comfort level or patient's stated pain goal  Description: INTERVENTIONS:  - Encourage pt to monitor pain and request assistance  - Assess pain using appropriate pain scale  - Administer analgesics based on type and severity of pain and evaluate response  - Implement non-pharmacological measures as appropriate and evaluate response  - Consider cultural and social influences on pain and pain management  - Manage/alleviate anxiety  - Utilize distraction and/or relaxation techniques  - Monitor for opioid side effects  - Notify MD/LIP if interventions unsuccessful or patient reports new pain  - Anticipate increased pain with activity and pre-medicate as appropriate  Outcome: Progressing     Problem: RISK FOR INFECTION - ADULT  Goal: Absence of fever/infection during anticipated neutropenic period  Description: INTERVENTIONS  - Monitor WBC  - Administer growth factors as ordered  - Implement neutropenic guidelines  Outcome: Progressing     Problem: SAFETY ADULT - FALL  Goal: Free from fall injury  Description: INTERVENTIONS:  - Assess pt frequently for physical needs  - Identify cognitive and physical deficits and behaviors that affect risk of falls.   - [place] fall precautions as indicated by assessment.  - Educate pt/family on patient safety including physical limitations  - Instruct pt to call for assistance with activity based on assessment  - Modify environment to reduce risk of injury  - Provide assistive devices as appropriate  - Consider OT/PT consult to assist with strengthening/mobility  - Encourage toileting schedule  Outcome: Progressing     Problem: DISCHARGE PLANNING  Goal: Discharge to home or other facility with appropriate resources  Description: INTERVENTIONS:  - Identify barriers to discharge w/pt and caregiver  - Include patient/family/discharge partner in discharge planning  - Arrange for needed discharge resources and transportation as appropriate  - Identify discharge learning needs (meds, wound care, etc)  - Arrange for interpreters to assist at discharge as needed  - Consider post-discharge preferences of patient/family/discharge partner  - Complete POLST form as appropriate  - Assess patient's ability to be responsible for managing their own health  - Refer to Case Management Department for coordinating discharge planning if the patient needs post-hospital services based on physician/LIP order or complex needs related to functional status, cognitive ability or social support system  Outcome: Progressing     Problem: Altered Communication/Language Barrier  Goal: Patient/Family is able to understand and participate in their care  Description: Interventions:  - Assess communication ability and preferred communication style  - Implement communication aides and strategies  - Use visual cues when possible  - Listen attentively, be patient, do not interrupt  - Minimize distractions  - Allow time for understanding and response  - Establish method for patient to ask for assistance (call light)  - Provide an  as needed  - Communicate barriers and strategies to overcome with those who interact with patient  Outcome: Progressing

## 2023-02-17 NOTE — PROGRESS NOTES
[address]   NEUROCRITICAL CARE   PROGRESS NOTE    Sat down with the wife and daughter at bedside to discuss goals of care further. Described how typical medical management would look. Went over concern for breathing right now with oxygenation and mental status being two separate reasons why the endotracheal tube has remained in placee. Discussed how his communication centers appear to be intact when off sedation and that he may still be able to communicate. Discussed how damage from the bleed is affecting the left side of his body. Stated how improvements can be seen going forward in SNF setting however patient will never return to the way he was prior. Unlikely to be walking independently as well. Discussed how he may also need a surgical breathing and feeding tube placed before discharge. Family at this time understanding, stating that patient was very independent and would never have wanted to be on life support. Would not have wanted to be dependent on others. Both in agreement to proceed with comfort care. They do not want to prolong suffering so would like to move forward with compassionate extubation today once ready. Asked how long he would last after extubation. Given current clinical situation, I predicted he would pass within hours of extubation however he may stabilize and last longer than that or may go sooner. Reiterated that our goal during that time would be to focus on comfort as he passes in a hospice setting. All questions currently answered. Updated RN. Declined  services at this time. Comfort care order set entered, CODE STATUS changed  We will follow peripherally at this time, call if further questions or concerns    Additional critical care time 25 minutes.     Josie Nicholson, [address] [facility]

## 2023-02-17 NOTE — PHYSICAL THERAPY NOTE
Cont to be not approp for skilled PT. Spoke with nursing to f/u on 2/19/23 and will be more likely approp.

## 2023-02-17 NOTE — PROGRESS NOTES
Patient too unstable to take to CT currently, Dr Kelly Tello aware and said can hold off on CTA until patient more stable.   She recommended bolus with fentanyl, done @ 0700

## 2023-02-18 NOTE — PLAN OF CARE
Patient  at 21:21. Family updated, death paper work completed. Body cleaned and prepped to go to [facility].

## 2024-08-27 NOTE — PROGRESS NOTES
Initial Post Discharge Follow Up   Discharge Date: 3/9/21  Contact Date: 3/10/2021    Consent Verification:  Assessment Completed With: Patient  HIPAA Verified?   Yes    Discharge Dx:   COPD exacerbation    Was TCC ordered: no       General:   • How have removed. by mouth 3 (three) times a week. 36 tablet 3   • Azelastine HCl 0.1 % Nasal Solution 1 spray by Nasal route 2 (two) times daily. 30 mL 0   • ipratropium-albuterol 0.5-2.5 (3) MG/3ML Inhalation Solution Take 3 mL by nebulization 4 (four) times daily.  120 vi Pulmonologist today. Is there any reason as to why you cannot make your appointments?    No     NCM Reviewed upcoming Specialist Appt with patient     Not Applicable       Interventions by NCM: NCM reviewed discharge instructions and when to seek medica

## (undated) DIAGNOSIS — C34.31 PRIMARY MALIGNANT NEOPLASM OF RIGHT LOWER LOBE OF LUNG (HCC): Primary | ICD-10-CM

## (undated) DEVICE — LIGAMAX 5 MM ENDOSCOPIC MULTIPLE CLIP APPLIER: Brand: LIGAMAX

## (undated) DEVICE — CRANIOTOMY CDS: Brand: MEDLINE INDUSTRIES, INC.

## (undated) DEVICE — SUT VICRYL 2-0 CP-2 J762D

## (undated) DEVICE — SOL  .9 1000ML BTL

## (undated) DEVICE — KIT DRN 1/8IN PVC 3 SPRG EVAC

## (undated) DEVICE — STERILE POLYISOPRENE POWDER-FREE SURGICAL GLOVES: Brand: PROTEXIS

## (undated) DEVICE — STERILE POLYISOPRENE POWDER-FREE SURGICAL GLOVES WITH EMOLLIENT COATING: Brand: PROTEXIS

## (undated) DEVICE — GAUZE TRAY STERILE 4X4 12PLY

## (undated) DEVICE — PROXIMATE RH ROTATING HEAD SKIN STAPLERS (35 WIDE) CONTAINS 35 STAINLESS STEEL STAPLES: Brand: PROXIMATE

## (undated) DEVICE — SOLUTION SURG DURAPREP 26ML

## (undated) DEVICE — ABSORBABLE HEMOSTAT (OXIDIZED REGENERATED CELLULOSE, U.S.P.): Brand: SURGICEL

## (undated) DEVICE — ZEISS MD DRAPE

## (undated) DEVICE — DRILL SRG OIL CRTDG MAESTRO

## (undated) DEVICE — SLEEVE KENDALL SCD EXPRESS MED

## (undated) DEVICE — BLADE ELECTRODE: Brand: EDGE

## (undated) DEVICE — WOUND RETRACTOR AND PROTECTOR: Brand: ALEXIS WOUND PROTECTOR-RETRACTOR

## (undated) DEVICE — SYRINGE 30ML LL TIP

## (undated) DEVICE — 3M™ IOBAN™ 2 ANTIMICROBIAL INCISE DRAPE 6650EZ: Brand: IOBAN™ 2

## (undated) DEVICE — LIGHT HANDLE

## (undated) DEVICE — FLOSEAL HEMOSTATIC MATRIX, 5ML: Brand: FLOSEAL HEMOSTATIC MATRIX

## (undated) DEVICE — CODMAN BACTISEAL EVD CATH SET

## (undated) DEVICE — DISPOSABLE DUAL IRRIGATING BIPOLAR FORCEPS, NON-STICK,: Brand: SPETZLER-MALIS

## (undated) DEVICE — THE ECHELON, ECHELON ENDOPATH™ AND ECHELON FLEX™ FAMILIES OF ENDOSCOPIC LINEAR CUTTERS AND RELOADS ARE STERILE, SINGLE PATIENT USE INSTRUMENTS THAT SIMULTANEOUSLY CUT AND STAPLE TISSUE. THERE ARE SIX STAGGERED ROWS OF STAPLES, THREE ON EITHER SIDE OF THE CUT LINE. THE 45 MM INSTRUMENTS HAVE A STAPLE LINE THATIS APPROXIMATELY 45 MM LONG AND A CUT LINE THAT IS APPROXIMATELY 42 MM LONG. THE SHAFT CAN ROTATE FREELY IN BOTH DIRECTIONS AND AN ARTICULATION MECHANISM ON ARTICULATING INSTRUMENTS ENABLES BENDING THE DISTAL PORTIONOF THE SHAFT TO FACILITATE LATERAL ACCESS OF THE OPERATIVE SITE.THE INSTRUMENTS ARE SHIPPED WITHOUT A RELOAD AND MUST BE LOADED PRIOR TO USE. A STAPLE RETAINING CAP ON THE RELOAD PROTECTS THE STAPLE LEG POINTS DURING SHIPPING AND TRANSPORTATION. THE INSTRUMENTS’ LOCK-OUT FEATURE IS DESIGNED TO PREVENT A USED RELOAD FROM BEING REFIRED.: Brand: ECHELON ENDOPATH

## (undated) DEVICE — BANDAGE,GAUZE,BULKEE II,4.5"X4.1YD,STRL: Brand: MEDLINE

## (undated) DEVICE — Device: Brand: INTELLICART™

## (undated) DEVICE — SUTURE MONOCRYL 4-0 PS-2

## (undated) DEVICE — SOL NACL IRRIG 0.9% 1000ML BTL

## (undated) DEVICE — STANDARD HYPODERMIC NEEDLE,POLYPROPYLENE HUB: Brand: MONOJECT

## (undated) DEVICE — BAG DRAINAGE DEPOT MERIT 24IN

## (undated) DEVICE — DRAIN CHEST DRY ADULT/PED

## (undated) DEVICE — MEDI-VAC NON-CONDUCTIVE SUCTION TUBING: Brand: CARDINAL HEALTH

## (undated) DEVICE — PROXIMATE SKIN STAPLERS (35 WIDE) CONTAINS 35 STAINLESS STEEL STAPLES (FIXED HEAD): Brand: PROXIMATE

## (undated) DEVICE — SUTURE VICRYL 2-0 CT-1

## (undated) DEVICE — GOWN,SIRUS,FABRIC-REINFORCED,X-LARGE: Brand: MEDLINE

## (undated) DEVICE — 3.0MM PRECISION NEURO (MATCH HEAD)

## (undated) DEVICE — RANEY SCALP CLIP STERILE: Brand: AESCULAP

## (undated) DEVICE — ENDOPATH ECHELON VASCULAR  RELOADS, WHITE, 35MM: Brand: ECHELON ENDOPATH

## (undated) DEVICE — PEN SKIN MARKING REG TIP VIOLT

## (undated) DEVICE — SKIN AFFIX .4ML

## (undated) DEVICE — GAUZE SPONGES,USP TYPE VII GAUZE, 12 PLY: Brand: CURITY

## (undated) DEVICE — 2.3MM SPIRAL ROUTER

## (undated) DEVICE — THE ECHELON FLEX POWERED PLUS ARTICULATING ENDOSCOPIC LINEAR CUTTERS ARE STERILE, SINGLE PATIENT USE INSTRUMENTS THAT SIMULTANEOUSLYCUT AND STAPLE TISSUE. THERE ARE SIX STAGGERED ROWS OF STAPLES, THREE ON EITHER SIDE OF THE CUT LINE. THE ECHELON FLEX 45 POWERED PLUSINSTRUMENTS HAVE A STAPLE LINE THAT IS APPROXIMATELY 45 MM LONG AND A CUT LINE THAT IS APPROXIMATELY 42 MM LONG. THE SHAFT CAN ROTATE FREELYIN BOTH DIRECTIONS AND AN ARTICULATION MECHANISM ENABLES THE DISTAL PORTION OF THE SHAFT TO PIVOT TO FACILITATE LATERAL ACCESS TO THE OPERATIVESITE.THE INSTRUMENTS ARE PACKAGED WITH A PRIMARY LITHIUM BATTERY PACK THAT MUST BE INSTALLED PRIOR TO USE. THERE ARE SPECIFIC REQUIREMENTS FORDISPOSING OF THE BATTERY PACK. REFER TO THE BATTERY PACK DISPOSAL SECTION.THE INSTRUMENTS ARE PACKAGED WITHOUT A RELOAD AND MUST BE LOADED PRIOR TO USE. A STAPLE RETAINING CAP ON THE RELOAD PROTECTS THE STAPLE LEGPOINTS DURING SHIPPING AND TRANSPORTATION. THE INSTRUMENTS’ LOCK-OUT FEATURE IS DESIGNED TO PREVENT A USED OR IMPROPERLY INSTALLED RELOADFROM BEING REFIRED OR AN INSTRUMENT FROM BEING FIRED WITHOUT A RELOAD.: Brand: ECHELON FLEX

## (undated) DEVICE — SOLUTION ENDOSCOPIC ANTI-FOG NON-TOXIC NON-ABRASIVE 6 CUBIC CENTIMETER WITH RADIOPAQUE ADHESIVE-BACKED SPONGE STERILE NOT MADE WITH NATURAL RUBBER LATEX MEDICHOICE: Brand: MEDICHOICE

## (undated) DEVICE — PAD SACRAL PREMIUM 12X12X1

## (undated) DEVICE — PROVE COVER: Brand: UNBRANDED

## (undated) DEVICE — ARYGLE SUCTION CATHETER WITH CHIMNEY VALVE STRIAGHT PACKED 14 FR/ CH: Brand: ARGYLE

## (undated) DEVICE — SUTURE SILK 0 FSL

## (undated) DEVICE — SUT NUROLON 4-0 TF C584D

## (undated) DEVICE — CODMAN® SURGICAL PATTIES 1/2" X 1/2" (1.27CM X 1.27CM): Brand: CODMAN®

## (undated) DEVICE — KENDALL SCD EXPRESS SLEEVES, KNEE LENGTH, MEDIUM: Brand: KENDALL SCD

## (undated) DEVICE — GLOVE SURG TRIUMPH SZ 8

## (undated) DEVICE — ECHELON FLEX  POWERED VASCULAR STAPLER WITH ADVANCED PLACEMENT TIP, 35MM: Brand: ECHELON FLEX

## (undated) DEVICE — CV PACK-LF: Brand: MEDLINE INDUSTRIES, INC.

## (undated) DEVICE — ANCHOR TISSUE RETRIEVAL SYSTEM, BAG SIZE 1200 ML, PORT SIZE 15 MM: Brand: ANCHOR TISSUE RETRIEVAL SYSTEM

## (undated) DEVICE — Device

## (undated) DEVICE — NON-ADHERENT PAD PREPACK: Brand: TELFA

## (undated) NOTE — LETTER
[address] [facility] FOR BLOOD OR BLOOD COMPONENTS      In the course of your treatment, it may become necessary to administer a transfusion of blood or blood components.  This form provides basic information concerning this proc explain the alternatives to you if it has not already been done. I,Tc Page, have read/had read to me the above. I understand the matters bearing on the decision whether or not to authorize a transfusion of blood or blood components.  I have no

## (undated) NOTE — LETTER
September 22, 2020    [address] [facility]  [address] [facility]    Dear Zaynab Menjivar: It was a pleasure speaking with you over the phone recently.  To follow up, I wanted to send you some contact information to utilize when you have a questio

## (undated) NOTE — LETTER
04/19/21        Sonal Jama  [address] [facility] [id number]      Dear Erick Summers records indicate that you have outstanding lab work and or testing that was ordered for you and has not yet been completed:  Orders Placed This En

## (undated) NOTE — LETTER
Brenda Woodson [address]  [address] [facility] [address], [address] [facility]  Authorization for Surgical Operation and Procedure     Date:___________                                                                                                         Time:__________ and/or blood products. The following are some, but not all, of the potential risks that can occur: fever and allergic reactions, hemolytic reactions, transmission of diseases such as Hepatitis, AIDS and Cytomegalovirus (CMV) and fluid overload.   In the ev (or a person authorized to consent on my behalf). The surgeon or my attending physician will determine when the applicable recovery period ends for purposes of reinstating the DNAR order.   10. Patients having a sterilization procedure: I understand that if 2. As the patient asking for anesthesia services, I agree to:  a. Allow the anesthesiologist (anesthesia doctor) to give me medicine and do additional procedures as necessary.  Some examples are: Starting or using an “IV” to give me medicine, fluids or bloo Very rare risks include infection, bleeding, seizure, irregular heart rhythms and nerve injury. 7. Regional Anesthesia (“spinal”, “epidural”, & “nerve blocks”):   I understand that rare but potential complications include headache, bleeding, infection, sei

## (undated) NOTE — ED AVS SNAPSHOT
Serjio Ward   MRN: [id number]    Department:  [facility] Emergency Department   Date of Visit:  8/23/2019           Disclosure     Insurance plans vary and the physician(s) referred by the ER may not be covered by your plan.  Please contac tell this physician (or your personal doctor if your instructions are to return to your personal doctor) about any new or lasting problems. The primary care or specialist physician will see patients referred from the [facility] Emergency Department.  Ray Garcia

## (undated) NOTE — Clinical Note
FYI, TCM call made, see notes. NCM attempted to schedule TCM HFU, with Dr. Marilyn Cuevas per patients request, however, patient states he would like to follow up with Dr. Santa Nixon first on 8/12/2020. Message sent to MD's office.